# Patient Record
Sex: FEMALE | Race: ASIAN | Employment: FULL TIME | URBAN - METROPOLITAN AREA
[De-identification: names, ages, dates, MRNs, and addresses within clinical notes are randomized per-mention and may not be internally consistent; named-entity substitution may affect disease eponyms.]

---

## 2017-01-24 DIAGNOSIS — E78.5 HYPERLIPIDEMIA, UNSPECIFIED HYPERLIPIDEMIA TYPE: ICD-10-CM

## 2017-01-24 RX ORDER — ATORVASTATIN CALCIUM 10 MG/1
10 TABLET, FILM COATED ORAL DAILY
Qty: 30 TAB | Refills: 5 | Status: SHIPPED | OUTPATIENT
Start: 2017-01-24 | End: 2017-07-31 | Stop reason: SDUPTHER

## 2017-03-31 DIAGNOSIS — I10 ESSENTIAL HYPERTENSION, BENIGN: ICD-10-CM

## 2017-03-31 RX ORDER — VALSARTAN 160 MG/1
160 TABLET ORAL DAILY
Qty: 30 TAB | Refills: 2 | Status: SHIPPED | OUTPATIENT
Start: 2017-03-31 | End: 2017-06-28 | Stop reason: SDUPTHER

## 2017-04-26 ENCOUNTER — OFFICE VISIT (OUTPATIENT)
Dept: INTERNAL MEDICINE CLINIC | Age: 62
End: 2017-04-26

## 2017-04-26 VITALS
OXYGEN SATURATION: 99 % | HEART RATE: 66 BPM | DIASTOLIC BLOOD PRESSURE: 69 MMHG | HEIGHT: 61 IN | RESPIRATION RATE: 14 BRPM | TEMPERATURE: 98.5 F | SYSTOLIC BLOOD PRESSURE: 115 MMHG | BODY MASS INDEX: 20.88 KG/M2 | WEIGHT: 110.6 LBS

## 2017-04-26 DIAGNOSIS — I10 ESSENTIAL HYPERTENSION, BENIGN: ICD-10-CM

## 2017-04-26 DIAGNOSIS — R73.02 GLUCOSE INTOLERANCE (IMPAIRED GLUCOSE TOLERANCE): ICD-10-CM

## 2017-04-26 DIAGNOSIS — E78.5 HYPERLIPIDEMIA LDL GOAL <130: Primary | Chronic | ICD-10-CM

## 2017-04-26 DIAGNOSIS — D50.9 IRON DEFICIENCY ANEMIA, UNSPECIFIED IRON DEFICIENCY ANEMIA TYPE: ICD-10-CM

## 2017-04-26 NOTE — PROGRESS NOTES
HISTORY OF PRESENT ILLNESS  Maribell Bowman is a 63 y.o. female. HPI  Subjective:   Maribell Bowman is a 63 y.o. female with hyperlipidemia. Cardiovascular risk analysis - 63 y.o. female LDL goal is under 130. ROS: taking medications as instructed, no medication side effects noted, no TIA's, no chest pain on exertion, no dyspnea on exertion, no swelling of ankles. Tolerating meds, no myalgias or other side effects noted  New concerns: stable  Subjective:   Maribell Bowman is a 63 y.o. female with hypertension. Hypertension ROS: taking medications as instructed, no medication side effects noted, no TIA's, no chest pain on exertion, no dyspnea on exertion, no swelling of ankles. New concerns: stable. Prediabetes-c ont to work on exercise and eating right - higher protein less carbs    Anemia- she is not taking iron- a multivitamin that she takes with iron     Review of Systems   Constitutional: Negative. Negative for chills, diaphoresis, fever, malaise/fatigue and weight loss. HENT: Negative for congestion, nosebleeds and tinnitus. Eyes: Negative for blurred vision, double vision and photophobia. Respiratory: Negative for cough, hemoptysis, sputum production, shortness of breath and wheezing. Cardiovascular: Negative for chest pain, palpitations, orthopnea, claudication, leg swelling and PND. Gastrointestinal: Negative for abdominal pain, blood in stool, constipation, diarrhea, heartburn, melena, nausea and vomiting. Genitourinary: Negative for dysuria, frequency, hematuria and urgency. Musculoskeletal: Negative for back pain, joint pain, myalgias and neck pain. Skin: Negative for itching and rash. Neurological: Negative for dizziness, tingling, sensory change, speech change, focal weakness, weakness and headaches. Endo/Heme/Allergies: Negative for polydipsia. Does not bruise/bleed easily. Psychiatric/Behavioral: Negative for depression.  The patient is not nervous/anxious and does not have insomnia. Physical Exam   Constitutional: She is oriented to person, place, and time. She appears well-developed and well-nourished. HENT:   Head: Normocephalic and atraumatic. Right Ear: External ear normal.   Left Ear: External ear normal.   Nose: Nose normal.   Mouth/Throat: Oropharynx is clear and moist.   Neck: Normal range of motion. Neck supple. No JVD present. Carotid bruit is not present. No thyroid mass and no thyromegaly present. Cardiovascular: Normal rate, regular rhythm, S1 normal, S2 normal, normal heart sounds, intact distal pulses and normal pulses. Exam reveals no gallop and no friction rub. No murmur heard. Pulmonary/Chest: Effort normal and breath sounds normal.   Abdominal: Soft. Bowel sounds are normal.   Musculoskeletal: Normal range of motion. Neurological: She is alert and oriented to person, place, and time. She has normal strength. Skin: Skin is warm and dry. Psychiatric: She has a normal mood and affect. Her behavior is normal. Judgment and thought content normal.   Nursing note and vitals reviewed.       ASSESSMENT and PLAN  Basia Davis was seen today for complete physical.    Diagnoses and all orders for this visit:    Hyperlipidemia LDL goal <130-cont current medicine   -     LIPID PANEL    Essential hypertension, benign- at goal stable  -     METABOLIC PANEL, COMPREHENSIVE    Iron deficiency anemia, unspecified iron deficiency anemia type- she is going to get her colonoscopy and EGD   -     CBC W/O DIFF  -     IRON PROFILE  -     FERRITIN    Prediabetes- will check labs today, doing well cont with exercise and eating right   lab results and schedule of future lab studies reviewed with patient  reviewed diet, exercise and weight control  cardiovascular risk and specific lipid/LDL goals reviewed  reviewed medications and side effects in detail

## 2017-05-05 LAB
ALBUMIN SERPL-MCNC: 4.1 G/DL (ref 3.6–4.8)
ALBUMIN/GLOB SERPL: 1.5 {RATIO} (ref 1.2–2.2)
ALP SERPL-CCNC: 58 IU/L (ref 39–117)
ALT SERPL-CCNC: 9 IU/L (ref 0–32)
AST SERPL-CCNC: 21 IU/L (ref 0–40)
BILIRUB SERPL-MCNC: 0.3 MG/DL (ref 0–1.2)
BUN SERPL-MCNC: 13 MG/DL (ref 8–27)
BUN/CREAT SERPL: 23 (ref 12–28)
CALCIUM SERPL-MCNC: 9.1 MG/DL (ref 8.7–10.3)
CHLORIDE SERPL-SCNC: 98 MMOL/L (ref 96–106)
CHOLEST SERPL-MCNC: 195 MG/DL (ref 100–199)
CO2 SERPL-SCNC: 26 MMOL/L (ref 18–29)
CREAT SERPL-MCNC: 0.57 MG/DL (ref 0.57–1)
ERYTHROCYTE [DISTWIDTH] IN BLOOD BY AUTOMATED COUNT: 13.6 % (ref 12.3–15.4)
FERRITIN SERPL-MCNC: 59 NG/ML (ref 15–150)
GLOBULIN SER CALC-MCNC: 2.7 G/DL (ref 1.5–4.5)
GLUCOSE SERPL-MCNC: 90 MG/DL (ref 65–99)
HCT VFR BLD AUTO: 34 % (ref 34–46.6)
HDLC SERPL-MCNC: 80 MG/DL
HGB BLD-MCNC: 12 G/DL (ref 11.1–15.9)
INTERPRETATION, 910389: NORMAL
IRON SATN MFR SERPL: 33 % (ref 15–55)
IRON SERPL-MCNC: 112 UG/DL (ref 27–159)
LDLC SERPL CALC-MCNC: 94 MG/DL (ref 0–99)
MCH RBC QN AUTO: 30.6 PG (ref 26.6–33)
MCHC RBC AUTO-ENTMCNC: 35.3 G/DL (ref 31.5–35.7)
MCV RBC AUTO: 87 FL (ref 79–97)
PLATELET # BLD AUTO: 261 X10E3/UL (ref 150–379)
POTASSIUM SERPL-SCNC: 4.3 MMOL/L (ref 3.5–5.2)
PROT SERPL-MCNC: 6.8 G/DL (ref 6–8.5)
RBC # BLD AUTO: 3.92 X10E6/UL (ref 3.77–5.28)
SODIUM SERPL-SCNC: 138 MMOL/L (ref 134–144)
TIBC SERPL-MCNC: 342 UG/DL (ref 250–450)
TRIGL SERPL-MCNC: 106 MG/DL (ref 0–149)
UIBC SERPL-MCNC: 230 UG/DL (ref 131–425)
VLDLC SERPL CALC-MCNC: 21 MG/DL (ref 5–40)
WBC # BLD AUTO: 3.9 X10E3/UL (ref 3.4–10.8)

## 2017-05-09 LAB
HBA1C MFR BLD: 6.1 % (ref 4.8–5.6)
SPECIMEN STATUS REPORT, ROLRST: NORMAL

## 2017-06-28 DIAGNOSIS — I10 ESSENTIAL HYPERTENSION, BENIGN: ICD-10-CM

## 2017-06-28 RX ORDER — VALSARTAN 160 MG/1
160 TABLET ORAL DAILY
Qty: 30 TAB | Refills: 3 | Status: SHIPPED | OUTPATIENT
Start: 2017-06-28 | End: 2017-11-24 | Stop reason: SDUPTHER

## 2017-07-31 DIAGNOSIS — E78.5 HYPERLIPIDEMIA, UNSPECIFIED HYPERLIPIDEMIA TYPE: ICD-10-CM

## 2017-07-31 RX ORDER — ATORVASTATIN CALCIUM 10 MG/1
10 TABLET, FILM COATED ORAL DAILY
Qty: 30 TAB | Refills: 11 | Status: SHIPPED | OUTPATIENT
Start: 2017-07-31 | End: 2018-01-24 | Stop reason: SDUPTHER

## 2017-12-21 DIAGNOSIS — I10 ESSENTIAL HYPERTENSION, BENIGN: ICD-10-CM

## 2017-12-21 RX ORDER — VALSARTAN 160 MG/1
TABLET ORAL
Qty: 30 TAB | Refills: 0 | Status: SHIPPED | OUTPATIENT
Start: 2017-12-21 | End: 2018-01-24 | Stop reason: SDUPTHER

## 2017-12-23 DIAGNOSIS — I10 ESSENTIAL HYPERTENSION, BENIGN: ICD-10-CM

## 2017-12-23 RX ORDER — VALSARTAN 160 MG/1
TABLET ORAL
Qty: 30 TAB | Refills: 0 | Status: SHIPPED | OUTPATIENT
Start: 2017-12-23 | End: 2018-01-24 | Stop reason: SDUPTHER

## 2018-01-24 ENCOUNTER — OFFICE VISIT (OUTPATIENT)
Dept: INTERNAL MEDICINE CLINIC | Age: 63
End: 2018-01-24

## 2018-01-24 VITALS
DIASTOLIC BLOOD PRESSURE: 67 MMHG | HEIGHT: 61 IN | OXYGEN SATURATION: 99 % | RESPIRATION RATE: 14 BRPM | HEART RATE: 78 BPM | WEIGHT: 113.2 LBS | TEMPERATURE: 98 F | BODY MASS INDEX: 21.37 KG/M2 | SYSTOLIC BLOOD PRESSURE: 116 MMHG

## 2018-01-24 DIAGNOSIS — M25.549 ARTHRALGIA OF HAND, UNSPECIFIED LATERALITY: ICD-10-CM

## 2018-01-24 DIAGNOSIS — E78.5 HYPERLIPIDEMIA, UNSPECIFIED HYPERLIPIDEMIA TYPE: ICD-10-CM

## 2018-01-24 DIAGNOSIS — I10 ESSENTIAL HYPERTENSION, BENIGN: ICD-10-CM

## 2018-01-24 DIAGNOSIS — R73.02 GLUCOSE INTOLERANCE (IMPAIRED GLUCOSE TOLERANCE): Primary | ICD-10-CM

## 2018-01-24 RX ORDER — VALSARTAN 160 MG/1
TABLET ORAL
Qty: 90 TAB | Refills: 1 | Status: SHIPPED | OUTPATIENT
Start: 2018-01-24 | End: 2018-11-14

## 2018-01-24 RX ORDER — ATORVASTATIN CALCIUM 10 MG/1
10 TABLET, FILM COATED ORAL DAILY
Qty: 90 TAB | Refills: 1 | Status: SHIPPED | OUTPATIENT
Start: 2018-01-24 | End: 2018-07-03 | Stop reason: SDUPTHER

## 2018-01-24 NOTE — PROGRESS NOTES
HISTORY OF PRESENT ILLNESS  Ajith Kendall is a 63 y.o. female. Presents with . HPI   Patient reports cough for a few days. She denies fever or chills. She states the cough is much better and flu swab was negative. Patient reports some joint pains in fingers. Hypertension ROS: taking medications as instructed, no medication side effects noted, no TIA's, no chest pain on exertion, no dyspnea on exertion, no swelling of ankles. New concerns:  Patient's BP in office today is 116/67. Patient continues Valsartan. Hyperlipidemia:  Cardiovascular risk analysis - 63 y.o. female LDL goal is under 130. ROS: taking medications as instructed, no medication side effects noted, no TIA's, no chest pain on exertion, no dyspnea on exertion, no swelling of ankles. Tolerating meds, no myalgias or other side effects noted  New concerns: Last LDL was 94 on 5/4/17. Patient continues Lipitor. Patient continues to exercise with walking 3 miles regularly. Review of Systems   All other systems reviewed and are negative. Physical Exam   Constitutional: She is oriented to person, place, and time. She appears well-developed and well-nourished. HENT:   Head: Normocephalic and atraumatic. Right Ear: External ear normal.   Left Ear: External ear normal.   Nose: Nose normal.   Mouth/Throat: Oropharynx is clear and moist.   Eyes: Conjunctivae and EOM are normal.   Neck: Normal range of motion. Neck supple. Carotid bruit is not present. No thyroid mass and no thyromegaly present. Cardiovascular: Normal rate, regular rhythm, S1 normal, S2 normal, normal heart sounds and intact distal pulses. Pulmonary/Chest: Effort normal and breath sounds normal.   Abdominal: Soft. Normal appearance and bowel sounds are normal. There is no hepatosplenomegaly. There is no tenderness. Musculoskeletal: Normal range of motion. Neurological: She is alert and oriented to person, place, and time. She has normal strength.  No cranial nerve deficit or sensory deficit. Coordination normal.   Skin: Skin is warm, dry and intact. No abrasion and no rash noted. Psychiatric: She has a normal mood and affect. Her behavior is normal. Judgment and thought content normal.   Nursing note and vitals reviewed. ASSESSMENT and PLAN  Diagnoses and all orders for this visit:    1. Glucose intolerance (impaired glucose tolerance)  Last A1C was 6.1%. Will monitor.   -     HEMOGLOBIN A1C WITH EAG  -     HEMOGLOBIN A1C WITH EAG    2. Essential hypertension, benign  BP is at goal. I do not recommend any change in medications. -     valsartan (DIOVAN) 160 mg tablet; TAKE 1 TABLET BY MOUTH DAILY  -     CBC W/O DIFF  -     CBC W/O DIFF    3. Hyperlipidemia, unspecified hyperlipidemia type  Stable, patient tolerating meds, no myalgias. I do not recommend any change in medications. -     atorvastatin (LIPITOR) 10 mg tablet; Take 1 Tab by mouth daily.  -     LIPID PANEL  -     METABOLIC PANEL, COMPREHENSIVE  -     METABOLIC PANEL, COMPREHENSIVE  -     LIPID PANEL    4. Arthralgia of hand, unspecified laterality  Will rule out gout flare as contributing to joint pains. Suspicious of arthritis and that it is inherited. -     URIC ACID  -     URIC ACID    Lab results and schedule of future lab studies reviewed with patient  reviewed diet, exercise and weight control    Written by Nydia Oviedo, as dictated by Ronnie Herrera MD.     Current diagnosis and concerns discussed with pt at length. Understands risks and benefits or current treatment plan and medications and accepts the treatment and medication with any possible risks. Pt asks appropriate questions which were answered. Pt instructed to call with any concerns or problems.

## 2018-01-26 LAB
ALBUMIN SERPL-MCNC: 4.4 G/DL (ref 3.6–4.8)
ALBUMIN/GLOB SERPL: 1.7 {RATIO} (ref 1.2–2.2)
ALP SERPL-CCNC: 62 IU/L (ref 39–117)
ALT SERPL-CCNC: 10 IU/L (ref 0–32)
AST SERPL-CCNC: 19 IU/L (ref 0–40)
BILIRUB SERPL-MCNC: 0.4 MG/DL (ref 0–1.2)
BUN SERPL-MCNC: 17 MG/DL (ref 8–27)
BUN/CREAT SERPL: 29 (ref 12–28)
CALCIUM SERPL-MCNC: 9.3 MG/DL (ref 8.7–10.3)
CHLORIDE SERPL-SCNC: 96 MMOL/L (ref 96–106)
CHOLEST SERPL-MCNC: 230 MG/DL (ref 100–199)
CO2 SERPL-SCNC: 27 MMOL/L (ref 18–29)
CREAT SERPL-MCNC: 0.59 MG/DL (ref 0.57–1)
ERYTHROCYTE [DISTWIDTH] IN BLOOD BY AUTOMATED COUNT: 12.9 % (ref 12.3–15.4)
EST. AVERAGE GLUCOSE BLD GHB EST-MCNC: 123 MG/DL
GFR SERPLBLD CREATININE-BSD FMLA CKD-EPI: 100 ML/MIN/1.73
GFR SERPLBLD CREATININE-BSD FMLA CKD-EPI: 115 ML/MIN/1.73
GLOBULIN SER CALC-MCNC: 2.6 G/DL (ref 1.5–4.5)
GLUCOSE SERPL-MCNC: 94 MG/DL (ref 65–99)
HBA1C MFR BLD: 5.9 % (ref 4.8–5.6)
HCT VFR BLD AUTO: 37.3 % (ref 34–46.6)
HDLC SERPL-MCNC: 78 MG/DL
HGB BLD-MCNC: 12.5 G/DL (ref 11.1–15.9)
INTERPRETATION, 910389: NORMAL
LDLC SERPL CALC-MCNC: 132 MG/DL (ref 0–99)
MCH RBC QN AUTO: 30.3 PG (ref 26.6–33)
MCHC RBC AUTO-ENTMCNC: 33.5 G/DL (ref 31.5–35.7)
MCV RBC AUTO: 90 FL (ref 79–97)
PLATELET # BLD AUTO: 323 X10E3/UL (ref 150–379)
POTASSIUM SERPL-SCNC: 4.7 MMOL/L (ref 3.5–5.2)
PROT SERPL-MCNC: 7 G/DL (ref 6–8.5)
RBC # BLD AUTO: 4.13 X10E6/UL (ref 3.77–5.28)
SODIUM SERPL-SCNC: 137 MMOL/L (ref 134–144)
TRIGL SERPL-MCNC: 98 MG/DL (ref 0–149)
URATE SERPL-MCNC: 5.2 MG/DL (ref 2.5–7.1)
VLDLC SERPL CALC-MCNC: 20 MG/DL (ref 5–40)
WBC # BLD AUTO: 4.1 X10E3/UL (ref 3.4–10.8)

## 2018-07-03 DIAGNOSIS — E78.5 HYPERLIPIDEMIA, UNSPECIFIED HYPERLIPIDEMIA TYPE: ICD-10-CM

## 2018-07-03 RX ORDER — ATORVASTATIN CALCIUM 10 MG/1
10 TABLET, FILM COATED ORAL DAILY
Qty: 90 TAB | Refills: 1 | Status: SHIPPED | OUTPATIENT
Start: 2018-07-03 | End: 2019-01-04 | Stop reason: SDUPTHER

## 2018-07-03 NOTE — TELEPHONE ENCOUNTER
Patient needs a refill on her atorvastatin (LIPITOR) 10 mg tablet  Called into the [facility]  [phone]

## 2018-10-13 RX ORDER — IRBESARTAN 150 MG/1
TABLET ORAL
Qty: 30 TAB | Refills: 1 | Status: SHIPPED | OUTPATIENT
Start: 2018-10-13 | End: 2018-12-11 | Stop reason: SDUPTHER

## 2018-11-14 ENCOUNTER — OFFICE VISIT (OUTPATIENT)
Dept: INTERNAL MEDICINE CLINIC | Age: 63
End: 2018-11-14

## 2018-11-14 VITALS
HEIGHT: 61 IN | BODY MASS INDEX: 21.11 KG/M2 | RESPIRATION RATE: 14 BRPM | HEART RATE: 64 BPM | OXYGEN SATURATION: 100 % | SYSTOLIC BLOOD PRESSURE: 132 MMHG | DIASTOLIC BLOOD PRESSURE: 72 MMHG | TEMPERATURE: 97.5 F | WEIGHT: 111.8 LBS

## 2018-11-14 DIAGNOSIS — I10 ESSENTIAL HYPERTENSION, BENIGN: Primary | ICD-10-CM

## 2018-11-14 DIAGNOSIS — E78.5 HYPERLIPIDEMIA, UNSPECIFIED HYPERLIPIDEMIA TYPE: ICD-10-CM

## 2018-11-14 DIAGNOSIS — R73.03 PREDIABETES: ICD-10-CM

## 2018-11-14 NOTE — PROGRESS NOTES
HISTORY OF PRESENT ILLNESS Lisa Ennis is a 66 y.o. female. HPI Hypertension ROS: taking medications as instructed, no medication side effects noted, no TIA's, no chest pain on exertion, no dyspnea on exertion, no swelling of ankles. New concerns:  Patient's BP in office today is 132/72. She continues on Avapro and Diovan. She ran 4 miles today and exercises regularly. Hyperlipidemia: 
Cardiovascular risk analysis - 66 y.o. female LDL goal is under 130. ROS: taking medications as instructed, no medication side effects noted, no TIA's, no chest pain on exertion, no dyspnea on exertion, no swelling of ankles. Tolerating meds, no myalgias or other side effects noted New concerns: Her last LDL was 132 on 1/25/18. Pt continues on Lipitor. Pt is afraid to undergo colonoscopy. She also notes that insurance doesn't cover Cologuard. Review of Systems All other systems reviewed and are negative. Physical Exam  
Constitutional: She is oriented to person, place, and time. She appears well-developed and well-nourished. HENT:  
Head: Normocephalic and atraumatic. Right Ear: External ear normal.  
Left Ear: External ear normal.  
Nose: Nose normal.  
Mouth/Throat: Oropharynx is clear and moist.  
Eyes: Conjunctivae and EOM are normal.  
Neck: Normal range of motion. Neck supple. Carotid bruit is not present. No thyroid mass and no thyromegaly present. Cardiovascular: Normal rate, regular rhythm, S1 normal, S2 normal, normal heart sounds and intact distal pulses. Pulmonary/Chest: Effort normal and breath sounds normal.  
Abdominal: Soft. Normal appearance and bowel sounds are normal. There is no hepatosplenomegaly. There is no tenderness. Musculoskeletal: Normal range of motion. Neurological: She is alert and oriented to person, place, and time. She has normal strength. No cranial nerve deficit or sensory deficit.  Coordination normal.  
 Skin: Skin is warm, dry and intact. No abrasion and no rash noted. Psychiatric: She has a normal mood and affect. Her behavior is normal. Judgment and thought content normal.  
Nursing note and vitals reviewed. ASSESSMENT and PLAN Diagnoses and all orders for this visit: 1. Essential hypertension, benign BP is at goal. I do not recommend any change in medications. 
-     METABOLIC PANEL, COMPREHENSIVE 2. Hyperlipidemia, unspecified hyperlipidemia type Stable, patient tolerating meds, no myalgias. I do not recommend any change in medications. 
-     LIPID PANEL 3. Prediabetes Will monitor for lab results.  
-     HEMOGLOBIN A1C WITH EAG Additional Comments: Encouraged pt to consider colonoscopy. This note will not be viewable in [address] Lab results and schedule of future lab studies reviewed with patient. Reviewed diet, exercise and weight control. Written by Ricky Gannon, as dictated by Waylan Goodell, MD.  
 
Current diagnosis and concerns discussed with pt at length. Understands risks and benefits or current treatment plan and medications and accepts the treatment and medication with any possible risks. Pt asks appropriate questions which were answered. Pt instructed to call with any concerns or problems.

## 2018-11-30 LAB
ALBUMIN SERPL-MCNC: 4.4 G/DL (ref 3.6–4.8)
ALBUMIN/GLOB SERPL: 1.6 {RATIO} (ref 1.2–2.2)
ALP SERPL-CCNC: 70 IU/L (ref 39–117)
ALT SERPL-CCNC: 8 IU/L (ref 0–32)
AST SERPL-CCNC: 19 IU/L (ref 0–40)
BILIRUB SERPL-MCNC: 0.4 MG/DL (ref 0–1.2)
BUN SERPL-MCNC: 13 MG/DL (ref 8–27)
BUN/CREAT SERPL: 21 (ref 12–28)
CALCIUM SERPL-MCNC: 9.5 MG/DL (ref 8.7–10.3)
CHLORIDE SERPL-SCNC: 97 MMOL/L (ref 96–106)
CHOLEST SERPL-MCNC: 208 MG/DL (ref 100–199)
CO2 SERPL-SCNC: 26 MMOL/L (ref 20–29)
CREAT SERPL-MCNC: 0.62 MG/DL (ref 0.57–1)
EST. AVERAGE GLUCOSE BLD GHB EST-MCNC: 120 MG/DL
GLOBULIN SER CALC-MCNC: 2.8 G/DL (ref 1.5–4.5)
GLUCOSE SERPL-MCNC: 95 MG/DL (ref 65–99)
HBA1C MFR BLD: 5.8 % (ref 4.8–5.6)
HDLC SERPL-MCNC: 89 MG/DL
INTERPRETATION, 910389: NORMAL
LDLC SERPL CALC-MCNC: 103 MG/DL (ref 0–99)
POTASSIUM SERPL-SCNC: 4.6 MMOL/L (ref 3.5–5.2)
PROT SERPL-MCNC: 7.2 G/DL (ref 6–8.5)
SODIUM SERPL-SCNC: 136 MMOL/L (ref 134–144)
TRIGL SERPL-MCNC: 82 MG/DL (ref 0–149)
VLDLC SERPL CALC-MCNC: 16 MG/DL (ref 5–40)

## 2018-12-01 LAB
BASOPHILS # BLD AUTO: 0 X10E3/UL (ref 0–0.2)
BASOPHILS NFR BLD AUTO: 1 %
EOSINOPHIL # BLD AUTO: 0.2 X10E3/UL (ref 0–0.4)
EOSINOPHIL NFR BLD AUTO: 5 %
ERYTHROCYTE [DISTWIDTH] IN BLOOD BY AUTOMATED COUNT: 13 % (ref 12.3–15.4)
HCT VFR BLD AUTO: 39.8 % (ref 34–46.6)
HGB BLD-MCNC: 13.1 G/DL (ref 11.1–15.9)
IMM GRANULOCYTES # BLD: 0 X10E3/UL (ref 0–0.1)
IMM GRANULOCYTES NFR BLD: 0 %
LYMPHOCYTES # BLD AUTO: 1.7 X10E3/UL (ref 0.7–3.1)
LYMPHOCYTES NFR BLD AUTO: 39 %
MCH RBC QN AUTO: 30.7 PG (ref 26.6–33)
MCHC RBC AUTO-ENTMCNC: 32.9 G/DL (ref 31.5–35.7)
MCV RBC AUTO: 93 FL (ref 79–97)
MONOCYTES # BLD AUTO: 0.4 X10E3/UL (ref 0.1–0.9)
MONOCYTES NFR BLD AUTO: 8 %
NEUTROPHILS # BLD AUTO: 2.1 X10E3/UL (ref 1.4–7)
NEUTROPHILS NFR BLD AUTO: 47 %
PLATELET # BLD AUTO: 298 X10E3/UL (ref 150–379)
RBC # BLD AUTO: 4.27 X10E6/UL (ref 3.77–5.28)
SPECIMEN STATUS REPORT, ROLRST: NORMAL
WBC # BLD AUTO: 4.5 X10E3/UL (ref 3.4–10.8)

## 2018-12-11 RX ORDER — IRBESARTAN 150 MG/1
TABLET ORAL
Qty: 30 TAB | Refills: 1 | Status: SHIPPED | OUTPATIENT
Start: 2018-12-11 | End: 2019-01-13 | Stop reason: SDUPTHER

## 2019-01-04 DIAGNOSIS — E78.5 HYPERLIPIDEMIA, UNSPECIFIED HYPERLIPIDEMIA TYPE: ICD-10-CM

## 2019-01-04 RX ORDER — ATORVASTATIN CALCIUM 10 MG/1
TABLET, FILM COATED ORAL
Qty: 90 TAB | Refills: 1 | Status: SHIPPED | OUTPATIENT
Start: 2019-01-04 | End: 2019-06-10 | Stop reason: SDUPTHER

## 2019-05-12 RX ORDER — IRBESARTAN 150 MG/1
TABLET ORAL
Qty: 30 TAB | Refills: 0 | Status: SHIPPED | OUTPATIENT
Start: 2019-05-12 | End: 2019-06-10 | Stop reason: SDUPTHER

## 2019-06-10 ENCOUNTER — OFFICE VISIT (OUTPATIENT)
Dept: INTERNAL MEDICINE CLINIC | Age: 64
End: 2019-06-10

## 2019-06-10 VITALS
BODY MASS INDEX: 20.69 KG/M2 | HEIGHT: 61 IN | WEIGHT: 109.6 LBS | DIASTOLIC BLOOD PRESSURE: 85 MMHG | OXYGEN SATURATION: 100 % | TEMPERATURE: 98.2 F | RESPIRATION RATE: 16 BRPM | SYSTOLIC BLOOD PRESSURE: 135 MMHG | HEART RATE: 65 BPM

## 2019-06-10 DIAGNOSIS — E78.5 HYPERLIPIDEMIA, UNSPECIFIED HYPERLIPIDEMIA TYPE: Primary | ICD-10-CM

## 2019-06-10 DIAGNOSIS — I10 ESSENTIAL HYPERTENSION, BENIGN: ICD-10-CM

## 2019-06-10 DIAGNOSIS — R73.03 PREDIABETES: ICD-10-CM

## 2019-06-10 RX ORDER — ATORVASTATIN CALCIUM 10 MG/1
10 TABLET, FILM COATED ORAL DAILY
Qty: 90 TAB | Refills: 1 | Status: SHIPPED | OUTPATIENT
Start: 2019-06-10 | End: 2019-12-11 | Stop reason: SDUPTHER

## 2019-06-10 RX ORDER — IRBESARTAN 150 MG/1
150 TABLET ORAL DAILY
Qty: 90 TAB | Refills: 1 | Status: SHIPPED | OUTPATIENT
Start: 2019-06-10 | End: 2019-12-17 | Stop reason: ALTCHOICE

## 2019-06-10 NOTE — PROGRESS NOTES
HISTORY OF PRESENT ILLNESS  Svetlana Horner is a 60 y.o. female. HPI Pt presents with   Prediabetes: Pt reports she exercises frequently. Hypertension ROS: taking medications as instructed, no medication side effects noted, no TIA's, no chest pain on exertion, no dyspnea on exertion, no swelling of ankles. New concerns: BP in office today is 135/85. Hyperlipidemia:  Cardiovascular risk analysis - 60 y.o. female LDL goal is under 100. ROS: taking medications as instructed, no medication side effects noted, no TIA's, no chest pain on exertion, no dyspnea on exertion, no swelling of ankles. Tolerating meds, no myalgias or other side effects noted  New concerns: Pt notes she frequently runs (4 miles). Currently they are dog-sitting and they are walking instead of running. PT reports that her family is moving to [place] next year and she is very excited. Review of Systems   All other systems reviewed and are negative. Physical Exam   Constitutional: She is oriented to person, place, and time. She appears well-developed and well-nourished. HENT:   Head: Normocephalic and atraumatic. Right Ear: External ear normal.   Left Ear: External ear normal.   Nose: Nose normal.   Mouth/Throat: Oropharynx is clear and moist.   Eyes: Pupils are equal, round, and reactive to light. Conjunctivae and EOM are normal.   Neck: Normal range of motion. Neck supple. Carotid bruit is not present. No thyroid mass and no thyromegaly present. Cardiovascular: Normal rate, regular rhythm, normal heart sounds and intact distal pulses. Pulmonary/Chest: Effort normal and breath sounds normal. Right breast exhibits no inverted nipple, no mass, no nipple discharge, no skin change and no tenderness. Left breast exhibits no inverted nipple, no mass, no nipple discharge, no skin change and no tenderness. No breast swelling, tenderness, discharge or bleeding. Breasts are symmetrical.   Abdominal: Soft.  Bowel sounds are normal.   Genitourinary: Rectum normal and vagina normal. Rectal exam shows anal tone normal and guaiac negative stool. No breast swelling, tenderness, discharge or bleeding. Musculoskeletal: Normal range of motion. Neurological: She is alert and oriented to person, place, and time. She has normal strength. No cranial nerve deficit or sensory deficit. Coordination normal.   Skin: Skin is warm and dry. No abrasion and no rash noted. Psychiatric: She has a normal mood and affect. Her behavior is normal. Judgment and thought content normal.   Nursing note and vitals reviewed. ASSESSMENT and PLAN  Diagnoses and all orders for this visit:    1. Hyperlipidemia, unspecified hyperlipidemia type  Stable, patient is tolerating pain medications, no myalgias. I do not recommend any change in medications.   -     LIPID PANEL  -     atorvastatin (LIPITOR) 10 mg tablet; Take 1 Tab by mouth daily. 2. Essential hypertension, benign  BP is at goal. I do not recommend any change in medications.   -     METABOLIC PANEL, COMPREHENSIVE    3. Prediabetes  Stable and well-managed. Will continue to monitor for improvements or changes.  -     HEMOGLOBIN A1C WITH EAG    Other orders  -     irbesartan (AVAPRO) 150 mg tablet; Take 1 Tab by mouth daily. Lab results and schedule of future lab studies reviewed with patient. Reviewed diet, exercise and weight control. Written by Evgeny Jaquez, as dictated by Bronwyn Bhatti MD.     Current diagnosis and concerns discussed with pt at length. Understands risks and benefits or current treatment plan and medications and accepts the treatment and medication with any possible risks. Pt asks appropriate questions which were answered. Pt instructed to call with any concerns or problems. This note will not be viewable in [address]

## 2019-06-11 LAB
ALBUMIN SERPL-MCNC: 4.7 G/DL (ref 3.6–4.8)
ALBUMIN/GLOB SERPL: 1.9 {RATIO} (ref 1.2–2.2)
ALP SERPL-CCNC: 66 IU/L (ref 39–117)
ALT SERPL-CCNC: 11 IU/L (ref 0–32)
AST SERPL-CCNC: 21 IU/L (ref 0–40)
BILIRUB SERPL-MCNC: 0.3 MG/DL (ref 0–1.2)
BUN SERPL-MCNC: 12 MG/DL (ref 8–27)
BUN/CREAT SERPL: 20 (ref 12–28)
CALCIUM SERPL-MCNC: 9.5 MG/DL (ref 8.7–10.3)
CHLORIDE SERPL-SCNC: 100 MMOL/L (ref 96–106)
CHOLEST SERPL-MCNC: 216 MG/DL (ref 100–199)
CO2 SERPL-SCNC: 24 MMOL/L (ref 20–29)
CREAT SERPL-MCNC: 0.59 MG/DL (ref 0.57–1)
EST. AVERAGE GLUCOSE BLD GHB EST-MCNC: 123 MG/DL
GLOBULIN SER CALC-MCNC: 2.5 G/DL (ref 1.5–4.5)
GLUCOSE SERPL-MCNC: 98 MG/DL (ref 65–99)
HBA1C MFR BLD: 5.9 % (ref 4.8–5.6)
HDLC SERPL-MCNC: 84 MG/DL
INTERPRETATION, 910389: NORMAL
LDLC SERPL CALC-MCNC: 115 MG/DL (ref 0–99)
POTASSIUM SERPL-SCNC: 4.6 MMOL/L (ref 3.5–5.2)
PROT SERPL-MCNC: 7.2 G/DL (ref 6–8.5)
SODIUM SERPL-SCNC: 138 MMOL/L (ref 134–144)
TRIGL SERPL-MCNC: 84 MG/DL (ref 0–149)
VLDLC SERPL CALC-MCNC: 17 MG/DL (ref 5–40)

## 2019-06-20 RX ORDER — VALSARTAN 160 MG/1
160 TABLET ORAL DAILY
Qty: 90 TAB | Refills: 1 | Status: SHIPPED | OUTPATIENT
Start: 2019-06-20 | End: 2019-12-11 | Stop reason: SDUPTHER

## 2019-12-11 DIAGNOSIS — E78.5 HYPERLIPIDEMIA, UNSPECIFIED HYPERLIPIDEMIA TYPE: ICD-10-CM

## 2019-12-11 RX ORDER — VALSARTAN 160 MG/1
TABLET ORAL
Qty: 90 TAB | Refills: 1 | Status: SHIPPED | OUTPATIENT
Start: 2019-12-11 | End: 2019-12-17 | Stop reason: ALTCHOICE

## 2019-12-11 RX ORDER — ATORVASTATIN CALCIUM 10 MG/1
TABLET, FILM COATED ORAL
Qty: 90 TAB | Refills: 1 | Status: SHIPPED | OUTPATIENT
Start: 2019-12-11 | End: 2020-06-06

## 2019-12-13 ENCOUNTER — HOSPITAL ENCOUNTER (EMERGENCY)
Age: 64
Discharge: HOME | End: 2019-12-13
Attending: EMERGENCY MEDICINE
Payer: COMMERCIAL

## 2019-12-13 VITALS
OXYGEN SATURATION: 97 % | RESPIRATION RATE: 16 BRPM | DIASTOLIC BLOOD PRESSURE: 84 MMHG | HEIGHT: 61 IN | BODY MASS INDEX: 20.58 KG/M2 | WEIGHT: 109 LBS | HEART RATE: 73 BPM | SYSTOLIC BLOOD PRESSURE: 190 MMHG | TEMPERATURE: 98.1 F

## 2019-12-13 DIAGNOSIS — M25.562 ACUTE PAIN OF LEFT KNEE: Primary | ICD-10-CM

## 2019-12-13 PROCEDURE — 99283 EMERGENCY DEPT VISIT LOW MDM: CPT

## 2019-12-13 RX ORDER — NAPROXEN 500 MG/1
500 TABLET ORAL 2 TIMES DAILY WITH MEALS
Qty: 20 TAB | Refills: 0 | Status: SHIPPED | OUTPATIENT
Start: 2019-12-13 | End: 2019-12-23

## 2019-12-13 NOTE — ED TRIAGE NOTES
Pt ambulated to the treatment area with a steady gait. Pt states \"about 2-3 weeks ago I was running and landed on my left knee. It bothered me but I put a pressure support band on it and it felt better but now when I walk it hurts. \"

## 2019-12-13 NOTE — DISCHARGE INSTRUCTIONS

## 2019-12-13 NOTE — LETTER
[facility] EMERGENCY DEPT 
[address] [place] [address] 
[phone] Work/School Note Date: 12/13/2019 To Whom It May concern: 
 
Cristi Martinez was seen and treated today in the emergency room by the following provider: 
Dr. Lj Ward. Cristi Martinez may return to work on 12/18/2019. Sincerely, Ani Austin RN

## 2019-12-13 NOTE — ED PROVIDER NOTES
28-year-old female with a history of hypertension hyperlipidemia presents with left knee pain. She was running 2 or 3 weeks ago and fell on her left knee. It has not bothered her up until a couple days ago. She has been wearing a knee brace. 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 She denies any fever, chills, warmth or erythema to the knee. She denies any recent tick bites or rashes. She has been able to ambulate but it has been painful. She has noticed some swelling. Knee Pain           Past Medical History:   Diagnosis Date    Essential hypertension, benign 6/17/2015    Other and unspecified hyperlipidemia 11/10/2009    Other and unspecified hyperlipidemia 11/10/2009       History reviewed. No pertinent surgical history. History reviewed. No pertinent family history.     Social History     Socioeconomic History    Marital status:      Spouse name: Not on file    Number of children: Not on file    Years of education: Not on file    Highest education level: Not on file   Occupational History    Not on file   Social Needs    Financial resource strain: Not on file    Food insecurity:     Worry: Not on file     Inability: Not on file    Transportation needs:     Medical: Not on file     Non-medical: Not on file   Tobacco Use    Smoking status: Never Smoker    Smokeless tobacco: Never Used   Substance and Sexual Activity    Alcohol use: No    Drug use: No    Sexual activity: Not on file   Lifestyle    Physical activity:     Days per week: Not on file     Minutes per session: Not on file    Stress: Not on file   Relationships    Social connections:     Talks on phone: Not on file     Gets together: Not on file     Attends Islam service: Not on file     Active member of club or organization: Not on file     Attends meetings of clubs or organizations: Not on file     Relationship status: Not on file    Intimate partner violence:     Fear of current or ex partner: Not on file     Emotionally abused: Not on file     Physically abused: Not on file     Forced sexual activity: Not on file   Other Topics Concern    Not on file   Social History Narrative    Not on file         ALLERGIES: Patient has no known allergies. Review of Systems   All other systems reviewed and are negative. Vitals:    12/13/19 0931   BP: 190/84   Pulse: 73   Resp: 16   Temp: 98.1 °F (36.7 °C)   SpO2: 97%   Weight: 49.4 kg (109 lb)   Height: 5' 1\" (1.549 m)            Physical Exam  Constitutional:       Appearance: She is well-developed. HENT:      Head: Normocephalic and atraumatic. Eyes:      General: No scleral icterus. Neck:      Musculoskeletal: No neck rigidity. Trachea: No tracheal deviation. Cardiovascular:      Rate and Rhythm: Normal rate. Pulmonary:      Effort: Pulmonary effort is normal. No respiratory distress. Abdominal:      General: There is no distension. Genitourinary:     Comments: deferred  Musculoskeletal:         General: No deformity. Comments: Left knee exam shows small effusion, no erythema or warmth, unable to elicit tenderness, no pain with valgus or varus stress, click and pain with Kris. Skin:     General: Skin is dry. Neurological:      General: No focal deficit present. Mental Status: She is alert. Psychiatric:         Mood and Affect: Mood normal.          MDM  Number of Diagnoses or Management Options  Acute pain of left knee:   Diagnosis management comments: 62-year-old female with knee pain likely secondary to meniscal tear versus osteoarthritis. No warmth, erythema, fever to suggest septic arthritis. No history of tick bite or rash to suggest Lyme arthritis. Ambulating, doubt fracture. Offered x-ray but reassured her that I do not feel that is fractured and she would be x-rayed when she follows up with orthopedics. She declined the x-ray. Will start on trial of NSAIDs. Refer to Ortho. Procedures

## 2019-12-13 NOTE — LETTER
[facility] EMERGENCY DEPT 
[address] [address] 
[phone] Work/School Note Date: 12/13/2019 To Whom It May concern: 
 
Brit Maciel was seen and treated today in the emergency room by the following providers: 
Dr. Everett Varela. Brit Maciel may return to work on 12/16/2019. Sincerely, Dwayne Renae RN

## 2019-12-13 NOTE — ED NOTES
Pt was discharged and given instructions by Dr Peyton White. Pt verbalized good understanding of all discharge instructions,prescriptions and F/U care. All questions answered. Pt in stable condition on discharge.

## 2019-12-17 RX ORDER — OLMESARTAN MEDOXOMIL 40 MG/1
40 TABLET ORAL DAILY
Qty: 90 TAB | Refills: 1 | Status: SHIPPED | OUTPATIENT
Start: 2019-12-17 | End: 2020-03-22

## 2020-01-03 ENCOUNTER — OFFICE VISIT (OUTPATIENT)
Dept: INTERNAL MEDICINE CLINIC | Age: 65
End: 2020-01-03

## 2020-01-03 ENCOUNTER — HOSPITAL ENCOUNTER (OUTPATIENT)
Dept: LAB | Age: 65
Discharge: HOME | End: 2020-01-03

## 2020-01-03 VITALS
HEART RATE: 79 BPM | DIASTOLIC BLOOD PRESSURE: 56 MMHG | SYSTOLIC BLOOD PRESSURE: 90 MMHG | HEIGHT: 61 IN | WEIGHT: 110.6 LBS | RESPIRATION RATE: 14 BRPM | TEMPERATURE: 98.4 F | OXYGEN SATURATION: 100 % | BODY MASS INDEX: 20.88 KG/M2

## 2020-01-03 DIAGNOSIS — Z12.11 SCREENING FOR COLON CANCER: ICD-10-CM

## 2020-01-03 DIAGNOSIS — E78.5 HYPERLIPIDEMIA, UNSPECIFIED HYPERLIPIDEMIA TYPE: ICD-10-CM

## 2020-01-03 DIAGNOSIS — I10 ESSENTIAL HYPERTENSION, BENIGN: Primary | ICD-10-CM

## 2020-01-03 DIAGNOSIS — R73.03 PREDIABETES: ICD-10-CM

## 2020-01-03 DIAGNOSIS — M25.562 ACUTE PAIN OF LEFT KNEE: ICD-10-CM

## 2020-01-03 DIAGNOSIS — I10 ESSENTIAL HYPERTENSION, BENIGN: ICD-10-CM

## 2020-01-03 LAB
ALBUMIN SERPL-MCNC: 3.9 G/DL (ref 3.5–5)
ALBUMIN/GLOB SERPL: 1.2 {RATIO} (ref 1.1–2.2)
ALP SERPL-CCNC: 63 U/L (ref 45–117)
ALT SERPL-CCNC: 16 U/L (ref 12–78)
ANION GAP SERPL CALC-SCNC: 4 MMOL/L (ref 5–15)
AST SERPL-CCNC: 19 U/L (ref 15–37)
BILIRUB SERPL-MCNC: 0.4 MG/DL (ref 0.2–1)
BUN SERPL-MCNC: 14 MG/DL (ref 6–20)
BUN/CREAT SERPL: 23 (ref 12–20)
CALCIUM SERPL-MCNC: 9.2 MG/DL (ref 8.5–10.1)
CHLORIDE SERPL-SCNC: 100 MMOL/L (ref 97–108)
CHOLEST SERPL-MCNC: 225 MG/DL
CO2 SERPL-SCNC: 30 MMOL/L (ref 21–32)
CREAT SERPL-MCNC: 0.61 MG/DL (ref 0.55–1.02)
ERYTHROCYTE [DISTWIDTH] IN BLOOD BY AUTOMATED COUNT: 11.7 % (ref 11.5–14.5)
EST. AVERAGE GLUCOSE BLD GHB EST-MCNC: 120 MG/DL
GLOBULIN SER CALC-MCNC: 3.3 G/DL (ref 2–4)
GLUCOSE SERPL-MCNC: 106 MG/DL (ref 65–100)
HBA1C MFR BLD: 5.8 % (ref 4–5.6)
HCT VFR BLD AUTO: 37.8 % (ref 35–47)
HDLC SERPL-MCNC: 81 MG/DL
HDLC SERPL: 2.8 {RATIO} (ref 0–5)
HGB BLD-MCNC: 12.8 G/DL (ref 11.5–16)
LDLC SERPL CALC-MCNC: 120.4 MG/DL (ref 0–100)
LIPID PROFILE,FLP: ABNORMAL
MCH RBC QN AUTO: 30.3 PG (ref 26–34)
MCHC RBC AUTO-ENTMCNC: 33.9 G/DL (ref 30–36.5)
MCV RBC AUTO: 89.6 FL (ref 80–99)
NRBC # BLD: 0 K/UL (ref 0–0.01)
NRBC BLD-RTO: 0 PER 100 WBC
PLATELET # BLD AUTO: 270 K/UL (ref 150–400)
PMV BLD AUTO: 9.9 FL (ref 8.9–12.9)
POTASSIUM SERPL-SCNC: 4.8 MMOL/L (ref 3.5–5.1)
PROT SERPL-MCNC: 7.2 G/DL (ref 6.4–8.2)
RBC # BLD AUTO: 4.22 M/UL (ref 3.8–5.2)
SODIUM SERPL-SCNC: 134 MMOL/L (ref 136–145)
TRIGL SERPL-MCNC: 118 MG/DL (ref ?–150)
VLDLC SERPL CALC-MCNC: 23.6 MG/DL
WBC # BLD AUTO: 5.2 K/UL (ref 3.6–11)

## 2020-01-03 RX ORDER — MELOXICAM 15 MG/1
15 TABLET ORAL DAILY
Qty: 30 TAB | Refills: 3 | Status: SHIPPED | OUTPATIENT
Start: 2020-01-03 | End: 2020-06-29 | Stop reason: ALTCHOICE

## 2020-01-03 NOTE — PROGRESS NOTES
HISTORY OF PRESENT ILLNESS  Pretty Hamlin is a 60 y.o. female. HPI  Hypertension ROS: taking medications as instructed, no medication side effects noted, no TIA's, no chest pain on exertion, no dyspnea on exertion, no swelling of ankles. New concerns: BP in office today is 90/56. Hyperlipidemia:  Cardiovascular risk analysis - 60 y.o. female LDL goal is under 130. ROS: taking medications as instructed, no medication side effects noted, no TIA's, no chest pain on exertion, no dyspnea on exertion, no swelling of ankles. Tolerating meds, no myalgias or other side effects noted  New concerns: Pt's last LDL was 115 on 06/10/19. Knee pain: Pt reports that she presented at ED recently for knee pain. XR of knee showed no arthritis just fluid build up and swelling. was given Baclofen and Naprosyn for her knee pain- but she DC the both due to nausea. She has not been running since her knee injury. Pt reports that she and her  will be moving to [place], and eventually, they will move back home to the [facility]. She notes her is UTD on her mammogram- but she is due soon. She will make an appt this month. She gets her PAP done every 5 years by Dr. Giancarlo Pennington. Denies ever doing a colonoscopy. She notes that she has not drank water since last night. Review of Systems   Musculoskeletal: Positive for joint pain (knee). All other systems reviewed and are negative. Physical Exam  Vitals signs and nursing note reviewed. Constitutional:       Appearance: She is well-developed. HENT:      Head: Normocephalic and atraumatic. Right Ear: External ear normal.      Left Ear: External ear normal.      Nose: Nose normal.   Eyes:      Conjunctiva/sclera: Conjunctivae normal.      Pupils: Pupils are equal, round, and reactive to light. Neck:      Musculoskeletal: Normal range of motion and neck supple. Cardiovascular:      Rate and Rhythm: Normal rate and regular rhythm.       Heart sounds: Normal heart sounds. Pulmonary:      Effort: Pulmonary effort is normal.      Breath sounds: Normal breath sounds. Chest:      Breasts: Breasts are symmetrical.         Right: No inverted nipple, mass, nipple discharge, skin change or tenderness. Left: No inverted nipple, mass, nipple discharge, skin change or tenderness. Abdominal:      General: Bowel sounds are normal.      Palpations: Abdomen is soft. Genitourinary:     Vagina: Normal.      Rectum: Normal. Guaiac result negative. Normal anal tone. Musculoskeletal: Normal range of motion. Skin:     General: Skin is warm and dry. Neurological:      Mental Status: She is alert and oriented to person, place, and time. Psychiatric:         Behavior: Behavior normal.         Thought Content: Thought content normal.         Judgment: Judgment normal.         ASSESSMENT and PLAN  Diagnoses and all orders for this visit:    1. Essential hypertension, benign  BP is at goal. I do not recommend any change in medications. -     CBC W/O DIFF; Future    2. Hyperlipidemia, unspecified hyperlipidemia type  Stable, patient is tolerating medications, no myalgias. I do not recommend any change in medications.   -     METABOLIC PANEL, COMPREHENSIVE; Future  -     LIPID PANEL; Future    3. Screening for colon cancer  Ordered Cologuard. Will continue to monitor for improvements or changes. -     COLOGUARD TEST (FECAL DNA COLORECTAL CANCER SCREENING)    4. Acute pain of left knee  Prescribed Mobic for knee pain. Discussed with pt that Mobic is more stomach protective. Will continue to monitor for improvements or changes. -     meloxicam (MOBIC) 15 mg tablet; Take 1 Tab by mouth daily. 5. Prediabetes  Sugars stable. Continue to follow diabetic diet and monitor sugars.   -     HEMOGLOBIN A1C WITH EAG; Future     Additional comments: Discussed the Shingrex vaccine with pt.  Informed pt that this new vaccine is replacing the old one with a 90% protective factor. Explained to pt that the 2-part vaccine can cause a flu-like illness for 24-48 hours. Lab results and schedule of future lab studies reviewed with patient. Reviewed diet, exercise and weight control. Written by Jeana Armstrong, as dictated by Aaliyah Espinal MD.     Current diagnosis and concerns discussed with pt at length. Understands risks and benefits or current treatment plan and medications and accepts the treatment and medication with any possible risks. Pt asks appropriate questions which were answered. Pt instructed to call with any concerns or problems. This note will not be viewable in [address]

## 2020-01-28 ENCOUNTER — TELEPHONE (OUTPATIENT)
Dept: INTERNAL MEDICINE CLINIC | Age: 65
End: 2020-01-28

## 2020-01-28 NOTE — LETTER
2/4/2020 1:55 PM 
 
Ms. Alec Mora [address] [id number] Ms. Diane, Our office has attempted to contact you via phone on three separate occasions without a response from you. We have been trying to contact you in regards to the results of your Cologuard cancer screening test.  This test has resulted as positive. The recommendation when this test is positive is to have a colonoscopy preform by a gastroenterologist.  Please contact our office to let us know that you have received this letter and that you have scheduled an appointment to have a colonoscopy performed. Sincerely, Damion Sorensen MD

## 2020-02-03 ENCOUNTER — OFFICE VISIT (OUTPATIENT)
Dept: INTERNAL MEDICINE CLINIC | Age: 65
End: 2020-02-03

## 2020-02-03 VITALS
HEIGHT: 61 IN | TEMPERATURE: 98.2 F | OXYGEN SATURATION: 98 % | WEIGHT: 114 LBS | BODY MASS INDEX: 21.52 KG/M2 | RESPIRATION RATE: 16 BRPM | DIASTOLIC BLOOD PRESSURE: 68 MMHG | SYSTOLIC BLOOD PRESSURE: 111 MMHG | HEART RATE: 73 BPM

## 2020-02-03 DIAGNOSIS — I10 ESSENTIAL HYPERTENSION, BENIGN: ICD-10-CM

## 2020-02-03 DIAGNOSIS — R68.89 FLU-LIKE SYMPTOMS: Primary | ICD-10-CM

## 2020-02-03 RX ORDER — AZITHROMYCIN 250 MG/1
250 TABLET, FILM COATED ORAL SEE ADMIN INSTRUCTIONS
Qty: 6 TAB | Refills: 0 | Status: SHIPPED | OUTPATIENT
Start: 2020-02-03 | End: 2020-02-08

## 2020-02-03 RX ORDER — BENZONATATE 200 MG/1
200 CAPSULE ORAL
Qty: 30 CAP | Refills: 0 | Status: SHIPPED | OUTPATIENT
Start: 2020-02-03 | End: 2020-02-10

## 2020-02-03 NOTE — PROGRESS NOTES
HISTORY OF PRESENT ILLNESS  Sabino Stafford is a 60 y.o. female. HPI  Hyperlipidemia:  Cardiovascular risk analysis - 60 y.o. female LDL goal is under 130. ROS: taking medications as instructed, no medication side effects noted, no TIA's, no chest pain on exertion, no dyspnea on exertion, no swelling of ankles. Tolerating meds, no myalgias or other side effects noted. New concerns: Pt's last LDL was 120.4 on 1/03/20. Continues on Lipitor 10 mg. Hypertension ROS: taking medications as instructed, no medication side effects noted, no TIA's, no chest pain on exertion, no dyspnea on exertion, no swelling of ankles. New concerns: BP in office today is 111/68. Continues on Benicar 40 mg.     Flu-like sx: She started to experience sx on Thursday. She had a low grade fever, minor chills, congestion, and cough. She had body aches and fever after her Shingrix vaccine and thinks that caused her sx. Confirms she received flu vaccine. She is taking Ibuprofen and Robitussin with minor relief in her cough. Prediabetes: Pt's last a1c was 5.8 on 1/03/20. She notes that she had the Shingrix vaccine last Wednesday. Review of Systems   Constitutional: Positive for chills and fever (low grade). HENT: Positive for congestion. Respiratory: Positive for cough. All other systems reviewed and are negative. Physical Exam  Constitutional:       Appearance: Normal appearance. HENT:      Right Ear: Hearing, tympanic membrane and external ear normal.      Left Ear: Hearing, tympanic membrane and external ear normal.      Mouth/Throat:      Mouth: Mucous membranes are moist.      Pharynx: Oropharynx is clear. Cardiovascular:      Rate and Rhythm: Normal rate and regular rhythm. Pulmonary:      Effort: Pulmonary effort is normal.      Breath sounds: Normal breath sounds and air entry. Musculoskeletal: Normal range of motion. Skin:     General: Skin is warm and dry.    Neurological:      General: No focal deficit present. Mental Status: She is alert and oriented to person, place, and time. Psychiatric:         Mood and Affect: Mood normal.         Behavior: Behavior normal.         ASSESSMENT and PLAN  Diagnoses and all orders for this visit:    1. Flu-like symptoms  Prescribed Tessalon Perles and Zpak. Discussed with pt that her lungs sound clear, but her sx are not bacterial. Informed pt that her sx were likely viral which can take 7-10 days to dissipate. Informed pt that it is the trend of this season to have a lingering cough that lasts anywhere from 4-6 weeks. Advised pt to only take the abx if she continues to experience sx in 48-72h or she starts to experience f/c or a productive cough with thick mucus. Advised pt to f/u if she experiences worsening of sx despite complying with abx. Wrote a work for pt to be excused from work until 2/06.   -     benzonatate (TESSALON) 200 mg capsule; Take 1 Cap by mouth three (3) times daily as needed for Cough for up to 7 days. -     azithromycin (ZITHROMAX) 250 mg tablet; Take 1 Tab by mouth See Admin Instructions for 5 days. 2. Essential hypertension, benign  BP is at goal with Benicar 40 mg. I do not recommend any change in medications. Lab results and schedule of future lab studies reviewed with patient. Reviewed diet, exercise and weight control. Written by Jeana Armstrong, as dictated by Aaliyah Espinal MD.     Current diagnosis and concerns discussed with pt at length. Understands risks and benefits or current treatment plan and medications and accepts the treatment and medication with any possible risks. Pt asks appropriate questions which were answered. Pt instructed to call with any concerns or problems. This note will not be viewable in [address]

## 2020-03-22 RX ORDER — OLMESARTAN MEDOXOMIL 40 MG/1
TABLET ORAL
Qty: 90 TAB | Refills: 1 | Status: SHIPPED | OUTPATIENT
Start: 2020-03-22 | End: 2020-09-15

## 2020-06-29 ENCOUNTER — VIRTUAL VISIT (OUTPATIENT)
Dept: INTERNAL MEDICINE CLINIC | Age: 65
End: 2020-06-29

## 2020-06-29 DIAGNOSIS — E78.5 HYPERLIPIDEMIA, UNSPECIFIED HYPERLIPIDEMIA TYPE: ICD-10-CM

## 2020-06-29 DIAGNOSIS — I10 ESSENTIAL HYPERTENSION, BENIGN: Primary | ICD-10-CM

## 2020-06-29 DIAGNOSIS — R73.03 PREDIABETES: ICD-10-CM

## 2020-06-29 DIAGNOSIS — R19.5 POSITIVE COLORECTAL CANCER SCREENING USING COLOGUARD TEST: ICD-10-CM

## 2020-06-29 NOTE — PROGRESS NOTES
HISTORY OF PRESENT ILLNESS  Hobart Nyhan is a 63 y.o. female who is present, aware, and consenting for real-time synchronous virtual video visit through [employer]. HPI  Hypertension ROS: taking medications as instructed, no medication side effects noted, no TIA's, no chest pain on exertion, no dyspnea on exertion, no swelling of ankles. New concerns:  Continues on Benicar. Pt notes that she has resumed exercising. Denies CP or SOB. Hyperlipidemia:  Cardiovascular risk analysis - 63 y.o. female LDL goal is under 130. ROS: taking medications as instructed, no medication side effects noted, no TIA's, no chest pain on exertion, no dyspnea on exertion, no swelling of ankles. Tolerating meds, no myalgias or other side effects noted. New concerns: Pt's last LDL was 120 on 1/3/20. Continues on Lipitor. Health maintenance: Pt notes that she does not want to get a colonoscopy right now. Pt continues to work at [employer]. Review of Systems   All other systems reviewed and are negative. Physical Exam  Vitals signs reviewed. Constitutional:       General: She is not in acute distress. Appearance: Normal appearance. She is not ill-appearing, toxic-appearing or diaphoretic. HENT:      Right Ear: Hearing normal.      Left Ear: Hearing normal.      Nose: Nose normal.      Mouth/Throat:      Mouth: Mucous membranes are moist.      Pharynx: Oropharynx is clear. Eyes:      Conjunctiva/sclera: Conjunctivae normal.   Neck:      Musculoskeletal: Normal range of motion. Pulmonary:      Effort: No respiratory distress. Breath sounds: Normal air entry. Musculoskeletal: Normal range of motion. Skin:     General: Skin is warm and dry. Neurological:      General: No focal deficit present. Mental Status: She is alert and oriented to person, place, and time. Mental status is at baseline.    Psychiatric:         Mood and Affect: Mood normal.         Behavior: Behavior normal. Thought Content: Thought content normal.         Judgment: Judgment normal.         ASSESSMENT and PLAN  Diagnoses and all orders for this visit:    1. Essential hypertension, benign  BP is at goal. I do not recommend any change in medications. 2. Hyperlipidemia, unspecified hyperlipidemia type  Presumed stable, will recheck labs soon. Patient is tolerating medications with no myalgias. I do not recommend any change in treatment plan. -     METABOLIC PANEL, COMPREHENSIVE; Future  -     LIPID PANEL; Future    3. Positive colorectal cancer screening using Cologuard test  Discussed with pt importance of getting regular colonoscopies. 4. Prediabetes  Sugars presumed stable, will recheck soon. Continue to exercise and monitor diet.   -     HEMOGLOBIN A1C WITH EAG; Future    Lab results and schedule of future lab studies reviewed with patient. Reviewed diet, exercise and weight control. Written by Freddy Huerta, as dictated by Rosalba Ahumada, MD.     Current diagnosis and concerns discussed with pt at length. Understands risks and benefits or current treatment plan and medications and accepts the treatment and medication with any possible risks. Pt asks appropriate questions which were answered. Pt instructed to call with any concerns or problems.

## 2020-07-22 ENCOUNTER — HOSPITAL ENCOUNTER (OUTPATIENT)
Dept: LAB | Age: 65
Discharge: HOME | End: 2020-07-22

## 2020-07-22 DIAGNOSIS — E78.5 HYPERLIPIDEMIA, UNSPECIFIED HYPERLIPIDEMIA TYPE: ICD-10-CM

## 2020-07-22 DIAGNOSIS — R73.03 DIABETES MELLITUS, LATENT: ICD-10-CM

## 2020-07-22 LAB
ALBUMIN SERPL-MCNC: 4.2 G/DL (ref 3.5–5)
ALBUMIN/GLOB SERPL: 1.2 {RATIO} (ref 1.1–2.2)
ALP SERPL-CCNC: 67 U/L (ref 45–117)
ALT SERPL-CCNC: 19 U/L (ref 12–78)
ANION GAP SERPL CALC-SCNC: 6 MMOL/L (ref 5–15)
AST SERPL-CCNC: 16 U/L (ref 15–37)
BILIRUB SERPL-MCNC: 0.5 MG/DL (ref 0.2–1)
BUN SERPL-MCNC: 13 MG/DL (ref 6–20)
BUN/CREAT SERPL: 21 (ref 12–20)
CALCIUM SERPL-MCNC: 9.3 MG/DL (ref 8.5–10.1)
CHLORIDE SERPL-SCNC: 98 MMOL/L (ref 97–108)
CHOLEST SERPL-MCNC: 225 MG/DL
CO2 SERPL-SCNC: 28 MMOL/L (ref 21–32)
COMMENT, HOLDF: NORMAL
CREAT SERPL-MCNC: 0.61 MG/DL (ref 0.55–1.02)
EST. AVERAGE GLUCOSE BLD GHB EST-MCNC: 114 MG/DL
GLOBULIN SER CALC-MCNC: 3.6 G/DL (ref 2–4)
GLUCOSE SERPL-MCNC: 102 MG/DL (ref 65–100)
HBA1C MFR BLD: 5.6 % (ref 4–5.6)
HDLC SERPL-MCNC: 91 MG/DL
HDLC SERPL: 2.5 {RATIO} (ref 0–5)
LDLC SERPL CALC-MCNC: 113 MG/DL (ref 0–100)
LIPID PROFILE,FLP: ABNORMAL
POTASSIUM SERPL-SCNC: 5.1 MMOL/L (ref 3.5–5.1)
PROT SERPL-MCNC: 7.8 G/DL (ref 6.4–8.2)
SAMPLES BEING HELD,HOLD: NORMAL
SODIUM SERPL-SCNC: 132 MMOL/L (ref 136–145)
TRIGL SERPL-MCNC: 105 MG/DL (ref ?–150)
VLDLC SERPL CALC-MCNC: 21 MG/DL

## 2020-08-25 ENCOUNTER — TELEPHONE (OUTPATIENT)
Dept: INTERNAL MEDICINE CLINIC | Age: 65
End: 2020-08-25

## 2020-08-25 NOTE — TELEPHONE ENCOUNTER
Pt wants to speak with dr Davide Bailon, I advised she is in clinic/ re: stated she want to talk about her health.  Please call verf number

## 2020-08-25 NOTE — TELEPHONE ENCOUNTER
Spoke with patient and she states that on Saturday she had an episode of dizziness and now the right side of her body feels \"unstable\". She said it's hard to explain the feeling, it is not weakness or numbness. Pt was requesting a referral to a neurologist.  Samson Huge pt she needs to be seen to be evaluated first.  Provided appt 8/26/20 at 7:45am.  Pt understood and was thankful for the call.

## 2020-08-26 ENCOUNTER — OFFICE VISIT (OUTPATIENT)
Dept: INTERNAL MEDICINE CLINIC | Age: 65
End: 2020-08-26
Payer: COMMERCIAL

## 2020-08-26 ENCOUNTER — HOSPITAL ENCOUNTER (EMERGENCY)
Age: 65
Discharge: HOME | End: 2020-08-26
Attending: EMERGENCY MEDICINE
Payer: COMMERCIAL

## 2020-08-26 ENCOUNTER — APPOINTMENT (OUTPATIENT)
Dept: CT IMAGING | Age: 65
End: 2020-08-26
Attending: EMERGENCY MEDICINE
Payer: COMMERCIAL

## 2020-08-26 VITALS
HEART RATE: 75 BPM | BODY MASS INDEX: 20.58 KG/M2 | OXYGEN SATURATION: 100 % | HEIGHT: 61 IN | DIASTOLIC BLOOD PRESSURE: 68 MMHG | SYSTOLIC BLOOD PRESSURE: 121 MMHG | WEIGHT: 109 LBS | RESPIRATION RATE: 16 BRPM | TEMPERATURE: 98.8 F

## 2020-08-26 VITALS
BODY MASS INDEX: 21.31 KG/M2 | TEMPERATURE: 98 F | OXYGEN SATURATION: 99 % | DIASTOLIC BLOOD PRESSURE: 77 MMHG | WEIGHT: 112.8 LBS | RESPIRATION RATE: 16 BRPM | SYSTOLIC BLOOD PRESSURE: 129 MMHG | HEART RATE: 74 BPM

## 2020-08-26 DIAGNOSIS — R42 DIZZINESS: ICD-10-CM

## 2020-08-26 DIAGNOSIS — R53.1 WEAKNESS: Primary | ICD-10-CM

## 2020-08-26 DIAGNOSIS — R26.89 BALANCE DISORDER: ICD-10-CM

## 2020-08-26 DIAGNOSIS — G44.89 OTHER HEADACHE SYNDROME: ICD-10-CM

## 2020-08-26 DIAGNOSIS — R53.1 RIGHT SIDED WEAKNESS: Primary | ICD-10-CM

## 2020-08-26 DIAGNOSIS — R47.81 SLURRED SPEECH: ICD-10-CM

## 2020-08-26 LAB
ALBUMIN SERPL-MCNC: 3.8 G/DL (ref 3.5–5)
ALBUMIN/GLOB SERPL: 1 {RATIO} (ref 1.1–2.2)
ALP SERPL-CCNC: 53 U/L (ref 45–117)
ALT SERPL-CCNC: 13 U/L (ref 12–78)
ANION GAP SERPL CALC-SCNC: 8 MMOL/L (ref 5–15)
AST SERPL-CCNC: 14 U/L (ref 15–37)
BASOPHILS # BLD: 0 K/UL (ref 0–0.1)
BASOPHILS NFR BLD: 1 % (ref 0–1)
BILIRUB SERPL-MCNC: 0.4 MG/DL (ref 0.2–1)
BUN SERPL-MCNC: 15 MG/DL (ref 6–20)
BUN/CREAT SERPL: 26 (ref 12–20)
CALCIUM SERPL-MCNC: 8.7 MG/DL (ref 8.5–10.1)
CHLORIDE SERPL-SCNC: 98 MMOL/L (ref 97–108)
CO2 SERPL-SCNC: 25 MMOL/L (ref 21–32)
COMMENT, HOLDF: NORMAL
CREAT SERPL-MCNC: 0.58 MG/DL (ref 0.55–1.02)
DIFFERENTIAL METHOD BLD: ABNORMAL
EOSINOPHIL # BLD: 0.1 K/UL (ref 0–0.4)
EOSINOPHIL NFR BLD: 3 % (ref 0–7)
ERYTHROCYTE [DISTWIDTH] IN BLOOD BY AUTOMATED COUNT: 11.4 % (ref 11.5–14.5)
GLOBULIN SER CALC-MCNC: 3.7 G/DL (ref 2–4)
GLUCOSE SERPL-MCNC: 129 MG/DL (ref 65–100)
HCT VFR BLD AUTO: 33.8 % (ref 35–47)
HGB BLD-MCNC: 12 G/DL (ref 11.5–16)
IMM GRANULOCYTES # BLD AUTO: 0 K/UL (ref 0–0.04)
IMM GRANULOCYTES NFR BLD AUTO: 0 % (ref 0–0.5)
LYMPHOCYTES # BLD: 1.2 K/UL (ref 0.8–3.5)
LYMPHOCYTES NFR BLD: 23 % (ref 12–49)
MAGNESIUM SERPL-MCNC: 2.3 MG/DL (ref 1.6–2.4)
MCH RBC QN AUTO: 30.7 PG (ref 26–34)
MCHC RBC AUTO-ENTMCNC: 35.5 G/DL (ref 30–36.5)
MCV RBC AUTO: 86.4 FL (ref 80–99)
MONOCYTES # BLD: 0.4 K/UL (ref 0–1)
MONOCYTES NFR BLD: 8 % (ref 5–13)
NEUTS SEG # BLD: 3.4 K/UL (ref 1.8–8)
NEUTS SEG NFR BLD: 65 % (ref 32–75)
NRBC # BLD: 0 K/UL (ref 0–0.01)
NRBC BLD-RTO: 0 PER 100 WBC
PLATELET # BLD AUTO: 259 K/UL (ref 150–400)
PMV BLD AUTO: 8.6 FL (ref 8.9–12.9)
POTASSIUM SERPL-SCNC: 4.4 MMOL/L (ref 3.5–5.1)
PROT SERPL-MCNC: 7.5 G/DL (ref 6.4–8.2)
RBC # BLD AUTO: 3.91 M/UL (ref 3.8–5.2)
SAMPLES BEING HELD,HOLD: NORMAL
SODIUM SERPL-SCNC: 131 MMOL/L (ref 136–145)
WBC # BLD AUTO: 5.2 K/UL (ref 3.6–11)

## 2020-08-26 PROCEDURE — 99215 OFFICE O/P EST HI 40 MIN: CPT | Performed by: INTERNAL MEDICINE

## 2020-08-26 PROCEDURE — 83735 ASSAY OF MAGNESIUM: CPT

## 2020-08-26 PROCEDURE — 36415 COLL VENOUS BLD VENIPUNCTURE: CPT

## 2020-08-26 PROCEDURE — 70450 CT HEAD/BRAIN W/O DYE: CPT

## 2020-08-26 PROCEDURE — 80053 COMPREHEN METABOLIC PANEL: CPT

## 2020-08-26 PROCEDURE — 85025 COMPLETE CBC W/AUTO DIFF WBC: CPT

## 2020-08-26 PROCEDURE — 99284 EMERGENCY DEPT VISIT MOD MDM: CPT

## 2020-08-26 RX ORDER — ASCORBIC ACID 250 MG
TABLET ORAL
COMMUNITY

## 2020-08-26 RX ORDER — BISMUTH SUBSALICYLATE 262 MG
1 TABLET,CHEWABLE ORAL DAILY
COMMUNITY

## 2020-08-26 NOTE — PROGRESS NOTES
HISTORY OF PRESENT ILLNESS  Levon Burnette is a 63 y.o. female presenting with her . HPI  Hypertension ROS: taking medications as instructed, no medication side effects noted, no chest pain on exertion, no dyspnea on exertion, no swelling of ankles. New concerns: BP in office today is 129/77. Hyperlipidemia:  Cardiovascular risk analysis - 63 y.o. female LDL goal is under 100. ROS: taking medications as instructed, no medication side effects noted, no chest pain on exertion, no dyspnea on exertion, no swelling of ankles. Tolerating meds, no myalgias or other side effects noted. New concerns: Pt's last LDL was 113 on 7/22/20. R side weakness/slurred speech: Pt reports that when she got off work on Saturday, she was cooking at 5 am and then she started sweating and felt very dizzy and lightheaded. She notes that she lost control of her bowels and had to run to the bathroom. Pt states that she sat down after using the restroom and then felt fine for the rest of the day. Pt's  states that pt had garbled speech on Saturday and Sunday. Pt's  noticed that pt is slower during their regular walks starting on Saturday. Pt reports that the following Monday she returned to work and felt very unsteady while driving and had to concentrate very hard on her steering. She notes that when walking, her R side feels unsteady. Pt's  states that pt has been urinating more often. She notes that she continued to feel unsteady yesterday and slept most of the day. Denies abnormal gait, nausea, SOB, CP, or vomiting. Confirms change in handwriting. Of note, pt had HA all of last week which she managed with Motrin. Review of Systems   Genitourinary: Positive for frequency. Neurological: Positive for dizziness, speech change, weakness and headaches. All other systems reviewed and are negative. Physical Exam  Vitals signs and nursing note reviewed.    Constitutional:       Appearance: Normal appearance. She is normal weight. HENT:      Head: Normocephalic and atraumatic. Right Ear: Tympanic membrane, ear canal and external ear normal.      Left Ear: Tympanic membrane, ear canal and external ear normal.      Nose: Nose normal.      Mouth/Throat:      Mouth: Mucous membranes are dry. Pharynx: Oropharynx is clear. Neck:      Musculoskeletal: Normal range of motion and neck supple. Cardiovascular:      Rate and Rhythm: Normal rate and regular rhythm. Pulses: Normal pulses. Heart sounds: Normal heart sounds. Pulmonary:      Effort: Pulmonary effort is normal.      Breath sounds: Normal breath sounds. Abdominal:      General: Abdomen is flat. Palpations: Abdomen is soft. Musculoskeletal: Normal range of motion. Skin:     General: Skin is warm and dry. Neurological:      General: No focal deficit present. Mental Status: She is alert and oriented to person, place, and time. Mental status is at baseline. Motor: Weakness (R arm strength and ) present. Psychiatric:         Mood and Affect: Mood normal.         Behavior: Behavior normal.         Thought Content: Thought content normal.         Judgment: Judgment normal.         ASSESSMENT and PLAN    R sided weakness: Pt presents with unclear speech, R side weakness, and instability since Saturday. Informed pt that finding the etiology of her sxs requires an acute neural work-up and that I do not feel comfortable ordering outpatient testing. Discussed with pt that a MRI alone is not sufficient to ensure that her condition is properly addressed. Had serious conversation with pt that she needs to go to the ER today since her sxs are not resolved. Pt is very reluctant about going to the ER and I told her that it would be against medical advice to not go to the ER. Will continue to monitor for improvements or changes. Headache: Pt had a HA at the end of last week.  Informed pt that finding the etiology of her sxs requires an acute neural work-up and that I do not feel comfortable ordering outpatient testing. Discussed with pt that a MRI alone is not sufficient to ensure that her condition is properly addressed. Had serious conversation with pt that she needs to go to the ER today since her sxs are not resolved. Pt is very reluctant about going to the ER and I told her that it would be against medical advice to not go to the ER. Will continue to monitor for improvements or changes. Lab results and schedule of future lab studies reviewed with patient. Reviewed diet, exercise and weight control. Written by Sal Ortiz, as dictated by Brandon Mccrary MD.     Current diagnosis and concerns discussed with pt at length. Understands risks and benefits or current treatment plan and medications and accepts the treatment and medication with any possible risks. Pt asks appropriate questions which were answered. Pt instructed to call with any concerns or problems.

## 2020-08-26 NOTE — DISCHARGE INSTRUCTIONS
Patient Education        Dizziness: Care Instructions  Your Care Instructions  Dizziness is the feeling of unsteadiness or fuzziness in your head. It is different than having vertigo, which is a feeling that the room is spinning or that you are moving or falling. It is also different from lightheadedness, which is the feeling that you are about to faint. It can be hard to know what causes dizziness. Some people feel dizzy when they have migraine headaches. Sometimes bouts of flu can make you feel dizzy. Some medical conditions, such as heart problems or high blood pressure, can make you feel dizzy. Many medicines can cause dizziness, including medicines for high blood pressure, pain, or anxiety. If a medicine causes your symptoms, your doctor may recommend that you stop or change the medicine. If it is a problem with your heart, you may need medicine to help your heart work better. If there is no clear reason for your symptoms, your doctor may suggest watching and waiting for a while to see if the dizziness goes away on its own. Follow-up care is a key part of your treatment and safety. Be sure to make and go to all appointments, and call your doctor if you are having problems. It's also a good idea to know your test results and keep a list of the medicines you take. How can you care for yourself at home? · If your doctor recommends or prescribes medicine, take it exactly as directed. Call your doctor if you think you are having a problem with your medicine. · Do not drive while you feel dizzy. · Try to prevent falls. Steps you can take include:  ? Using nonskid mats, adding grab bars near the tub, and using night-lights. ? Clearing your home so that walkways are free of anything you might trip on.  ? Letting family and friends know that you have been feeling dizzy. This will help them know how to help you. When should you call for help? [id number] anytime you think you may need emergency care.  For example, call if:  · You passed out (lost consciousness). · You have dizziness along with symptoms of a heart attack. These may include:  ? Chest pain or pressure, or a strange feeling in the chest.  ? Sweating. ? Shortness of breath. ? Nausea or vomiting. ? Pain, pressure, or a strange feeling in the back, neck, jaw, or upper belly or in one or both shoulders or arms. ? Lightheadedness or sudden weakness. ? A fast or irregular heartbeat. · You have symptoms of a stroke. These may include:  ? Sudden numbness, tingling, weakness, or loss of movement in your face, arm, or leg, especially on only one side of your body. ? Sudden vision changes. ? Sudden trouble speaking. ? Sudden confusion or trouble understanding simple statements. ? Sudden problems with walking or balance. ? A sudden, severe headache that is different from past headaches. Call your doctor now or seek immediate medical care if:  · You feel dizzy and have a fever, headache, or ringing in your ears. · You have new or increased nausea and vomiting. · Your dizziness does not go away or comes back. Watch closely for changes in your health, and be sure to contact your doctor if:  · You do not get better as expected. Where can you learn more? Go to http://césar-nacho.info/  Enter Q823 in the search box to learn more about \"Dizziness: Care Instructions. \"  Current as of: June 26, 2019               Content Version: 12.5  © 8509-4762 [employer]. Care instructions adapted under license by [employer] (which disclaims liability or warranty for this information). If you have questions about a medical condition or this instruction, always ask your healthcare professional. [place] [address] any warranty or liability for your use of this information. Patient Education        Weakness: Care Instructions  Your Care Instructions     Weakness is a lack of physical or muscle strength.  You may feel that you need to make extra effort to move your arms, legs, or other muscles. Generalized weakness means that you feel weak in most areas of your body. Another type of weakness may affect just one muscle or group of muscles. You may feel weak and tired after you have done too much activity, such as taking an extra-long hike. This is not a serious problem. It often goes away on its own. Feeling weak can also be caused by medical conditions like thyroid problems, depression, or a virus. Sometimes the cause can be serious. Your doctor may want to do more tests to try to find the cause of the weakness. The doctor has checked you carefully, but problems can develop later. If you notice any problems or new symptoms, get medical treatment right away. Follow-up care is a key part of your treatment and safety. Be sure to make and go to all appointments, and call your doctor if you are having problems. It's also a good idea to know your test results and keep a list of the medicines you take. How can you care for yourself at home? · Rest when you feel tired. · Be safe with medicines. If your doctor prescribed medicine, take it exactly as prescribed. Call your doctor if you think you are having a problem with your medicine. You will get more details on the specific medicines your doctor prescribes. · Do not skip meals. Eating a balanced diet may increase your energy level. · Get some physical activity every day, but do not get too tired. When should you call for help? Call your doctor now or seek immediate medical care if:  · You have new or worse weakness. · You are dizzy or lightheaded, or you feel like you may faint. Watch closely for changes in your health, and be sure to contact your doctor if:  · You do not get better as expected. Where can you learn more? Go to http://césar-nacho.info/  Enter V492 in the search box to learn more about \"Weakness: Care Instructions. \"  Current as of: June 26, 2019               Content Version: 12.5  © 2290-0398 [employer]. Care instructions adapted under license by [employer] (which disclaims liability or warranty for this information). If you have questions about a medical condition or this instruction, always ask your healthcare professional. [address] any warranty or liability for your use of this information.

## 2020-08-26 NOTE — ED PROVIDER NOTES
HPI     Pt is a 63 y.o. F with PMH of HTN, HLD here with c/o right side weakness intermittently x 5 days. She says she only notices it when she is using her arm or leg. She describes her leg weakness as feeling slower. She feels when she writes with her right hand she feels it is not her normal writing. Prior to onset of sx she had diaphoresis and dizziness. She denies dizziness at this time. She had had headache last week x 3 days but then it subsided and she has not had any issues since that time. No other complaints at this time. Past Medical History:   Diagnosis Date    Essential hypertension, benign 6/17/2015    Other and unspecified hyperlipidemia 11/10/2009    Other and unspecified hyperlipidemia 11/10/2009       No past surgical history on file. No family history on file.     Social History     Socioeconomic History    Marital status:      Spouse name: Not on file    Number of children: Not on file    Years of education: Not on file    Highest education level: Not on file   Occupational History    Not on file   Social Needs    Financial resource strain: Not on file    Food insecurity     Worry: Not on file     Inability: Not on file    Transportation needs     Medical: Not on file     Non-medical: Not on file   Tobacco Use    Smoking status: Never Smoker    Smokeless tobacco: Never Used   Substance and Sexual Activity    Alcohol use: No    Drug use: No    Sexual activity: Not on file   Lifestyle    Physical activity     Days per week: Not on file     Minutes per session: Not on file    Stress: Not on file   Relationships    Social connections     Talks on phone: Not on file     Gets together: Not on file     Attends Shinto service: Not on file     Active member of club or organization: Not on file     Attends meetings of clubs or organizations: Not on file     Relationship status: Not on file    Intimate partner violence     Fear of current or ex partner: Not on file     Emotionally abused: Not on file     Physically abused: Not on file     Forced sexual activity: Not on file   Other Topics Concern    Not on file   Social History Narrative    Not on file         ALLERGIES: Patient has no known allergies. Review of Systems   Constitutional: Negative for chills, diaphoresis and fever. HENT: Negative for congestion and trouble swallowing. Eyes: Negative for photophobia and visual disturbance. Respiratory: Negative for cough, chest tightness and shortness of breath. Cardiovascular: Negative for chest pain, palpitations and leg swelling. Gastrointestinal: Negative for abdominal pain, diarrhea, nausea and vomiting. Genitourinary: Negative for difficulty urinating, dysuria, flank pain and frequency. Musculoskeletal: Negative for back pain, gait problem and myalgias. Skin: Negative for rash and wound. Neurological: Positive for dizziness, weakness and headaches. Negative for light-headedness. Hematological: Negative for adenopathy. Does not bruise/bleed easily. Psychiatric/Behavioral: Negative for agitation and confusion. All other systems reviewed and are negative. Vitals:    08/26/20 0842   BP: 149/85   Pulse: 75   Resp: 16   Temp: 98.8 °F (37.1 °C)   SpO2: 100%   Weight: 49.4 kg (109 lb)   Height: 5' 1\" (1.549 m)            Physical Exam  Vitals signs and nursing note reviewed. Constitutional:       General: She is not in acute distress. Appearance: She is well-developed. She is not diaphoretic. HENT:      Head: Normocephalic. Eyes:      Conjunctiva/sclera: Conjunctivae normal.      Pupils: Pupils are equal, round, and reactive to light. Neck:      Musculoskeletal: Normal range of motion and neck supple. Vascular: No JVD. Cardiovascular:      Rate and Rhythm: Normal rate and regular rhythm. Heart sounds: Normal heart sounds. Pulmonary:      Effort: Pulmonary effort is normal.      Breath sounds: Normal breath sounds. Abdominal:      General: Bowel sounds are normal. There is no distension. Palpations: Abdomen is soft. Tenderness: There is no abdominal tenderness. Musculoskeletal: Normal range of motion. General: No tenderness or deformity. Lymphadenopathy:      Cervical: No cervical adenopathy. Skin:     General: Skin is warm and dry. Capillary Refill: Capillary refill takes less than 2 seconds. Findings: No erythema or rash. Neurological:      Mental Status: She is alert and oriented to person, place, and time. Cranial Nerves: No cranial nerve deficit. Sensory: No sensory deficit. MDM       Procedures    9:12 AM  NIH score 0    10:37 AM  Patient's results have been reviewed with them. Patient and/or family have verbally conveyed their understanding and agreement of the patient's signs, symptoms, diagnosis, treatment and prognosis and additionally agree to follow up as recommended or return to the Emergency Room should their condition change prior to follow-up. Discharge instructions have also been provided to the patient with some educational information regarding their diagnosis as well a list of reasons why they would want to return to the ER prior to their follow-up appointment should their condition change.     Judy Murillo MD

## 2020-08-26 NOTE — ED TRIAGE NOTES
\"Right side is unsteady when she drives\". Right arm and leg weak since last Saturday. Also complains of dizziness and sweating before the weakness started. The weakness is intermittent and mainly when she is using it.

## 2020-08-27 ENCOUNTER — TELEPHONE (OUTPATIENT)
Dept: INTERNAL MEDICINE CLINIC | Age: 65
End: 2020-08-27

## 2020-08-27 NOTE — TELEPHONE ENCOUNTER
----- Message from [address] [place] [address]. Funalejandrolar sent at 8/27/2020  8:17 AM EDT -----  Regarding: RE: Referral Request  Contact: [phone]  Im unable to get any appointment if you dont send them a referral that i should be seen anytime soon. Please just   Send your referral to any doctor you want.

## 2020-08-27 NOTE — TELEPHONE ENCOUNTER
Spoke with neurology and obtained appt with Dr. Rudy Nelson 9/1/20 at 8:00am with a 7:30am arrival.    Left v/m for patient with appt details.

## 2020-08-27 NOTE — TELEPHONE ENCOUNTER
Please call and get her in with [facility] neurology for headache, right sided weakness, imbalance- slurred speech?  She went to ER and neg CT but needs neurology eval sooner than later- she is a nurse at [employer]

## 2020-08-31 DIAGNOSIS — E78.5 HYPERLIPIDEMIA, UNSPECIFIED HYPERLIPIDEMIA TYPE: ICD-10-CM

## 2020-08-31 RX ORDER — ATORVASTATIN CALCIUM 10 MG/1
TABLET, FILM COATED ORAL
Qty: 90 TAB | Refills: 0 | Status: SHIPPED | OUTPATIENT
Start: 2020-08-31 | End: 2020-09-03 | Stop reason: DRUGHIGH

## 2020-09-01 ENCOUNTER — HOSPITAL ENCOUNTER (OUTPATIENT)
Dept: MRI IMAGING | Age: 65
Discharge: HOME | End: 2020-09-01
Attending: PSYCHIATRY & NEUROLOGY
Payer: COMMERCIAL

## 2020-09-01 ENCOUNTER — OFFICE VISIT (OUTPATIENT)
Dept: NEUROLOGY | Age: 65
End: 2020-09-01
Payer: COMMERCIAL

## 2020-09-01 VITALS
OXYGEN SATURATION: 99 % | RESPIRATION RATE: 16 BRPM | WEIGHT: 109 LBS | TEMPERATURE: 97.2 F | HEIGHT: 61 IN | BODY MASS INDEX: 20.58 KG/M2 | HEART RATE: 68 BPM | DIASTOLIC BLOOD PRESSURE: 60 MMHG | SYSTOLIC BLOOD PRESSURE: 116 MMHG

## 2020-09-01 DIAGNOSIS — R51.9 ACUTE NONINTRACTABLE HEADACHE, UNSPECIFIED HEADACHE TYPE: ICD-10-CM

## 2020-09-01 DIAGNOSIS — R51.9 ACUTE NONINTRACTABLE HEADACHE, UNSPECIFIED HEADACHE TYPE: Primary | ICD-10-CM

## 2020-09-01 PROCEDURE — 99245 OFF/OP CONSLTJ NEW/EST HI 55: CPT | Performed by: PSYCHIATRY & NEUROLOGY

## 2020-09-01 PROCEDURE — 70551 MRI BRAIN STEM W/O DYE: CPT

## 2020-09-01 NOTE — PROGRESS NOTES
NEUROLOGY NEW PATIENT OFFICE CONSULTATION      9/1/2020    RE: Johanna Lu         1955      REFERRED BY:  Mary Singleton MD        CHIEF COMPLAINT:  This is Johanna Lu is a 63 y.o. female right handed [address] [place] [address] Dr doctor who had concerns including New Patient (Seen in the ER 3 days ago for she felt dizzy, broke out into sweats, loss bowel control. Still having R sided weakness. BTW Monday and is scared to drive.). HPI:     On 8/26/2020, patient went to [facility] ER for an event where she had generalized headache 4/10, on and off for 3-4 days then had diaphoresis and dizzy. Since then, patient noted unsteadiness described as R sided weakness for 5 days. Patient was offered admission, but patient declined. (+) stress with selling house. Takes ASA 81 every day. She increased it to 325 on her own accord. ROS   (-) fever  (-) rash  All other systems reviewed and are negative    Past Medical Hx  Past Medical History:   Diagnosis Date    Essential hypertension, benign 6/17/2015    Other and unspecified hyperlipidemia 11/10/2009    Other and unspecified hyperlipidemia 11/10/2009       Social Hx  Social History     Socioeconomic History    Marital status:      Spouse name: Not on file    Number of children: Not on file    Years of education: Not on file    Highest education level: Not on file   Tobacco Use    Smoking status: Never Smoker    Smokeless tobacco: Never Used   Substance and Sexual Activity    Alcohol use: No    Drug use: No       Family Hx  No family history on file. Stroke - parents    ALLERGIES  No Known Allergies    CURRENT MEDS  Current Outpatient Medications   Medication Sig Dispense Refill    atorvastatin (LIPITOR) 10 mg tablet TAKE 1 TABLET BY MOUTH EVERY DAY 90 Tab 0    multivitamin (ONE A DAY) tablet Take 1 Tab by mouth daily.  ascorbic acid, vitamin C, (Vitamin C) 250 mg tablet Take  by mouth.       olmesartan (BENICAR) 40 mg tablet TAKE 1 TABLET BY MOUTH EVERY DAY 90 Tab 1    aspirin 81 mg chewable tablet Take 81 mg by mouth daily. PREVIOUS WORKUP: (reviewed)  IMAGING:    CT Results (recent):  Results from Hospital Encounter encounter on 08/26/20   CT HEAD WO CONT    Narrative INDICATION: dizziness, headache, right side feels \"unsteady and weak\" x 5 days  normal exam     Exam: Noncontrast CT of the brain is performed with 5 mm collimation. CT dose reduction was achieved with the use of the standardized protocol  tailored for this examination and automatic exposure control for dose  modulation. There is no prior study for direct comparison. FINDINGS: There is no acute intracranial hemorrhage, mass, mass effect or  herniation. Ventricular system is normal. The gray-white matter differentiation  is well-preserved. The mastoid air cells are well pneumatized. The visualized  paranasal sinuses are normal.      Impression IMPRESSION: No acute intracranial hemorrhage, mass or infarct. MRI Results (recent):  No results found for this or any previous visit. IR Results (recent):  No results found for this or any previous visit. VAS/US Results (recent):  No results found for this or any previous visit. LABS (reviewed)  Results for orders placed or performed during the hospital encounter of 08/26/20   CBC WITH AUTOMATED DIFF   Result Value Ref Range    WBC 5.2 3.6 - 11.0 K/uL    RBC 3.91 3.80 - 5.20 M/uL    HGB 12.0 11.5 - 16.0 g/dL    HCT 33.8 (L) 35.0 - 47.0 %    MCV 86.4 80.0 - 99.0 FL    MCH 30.7 26.0 - 34.0 PG    MCHC 35.5 30.0 - 36.5 g/dL    RDW 11.4 (L) 11.5 - 14.5 %    PLATELET 307 136 - 510 K/uL    MPV 8.6 (L) 8.9 - 12.9 FL    NRBC 0.0 0  WBC    ABSOLUTE NRBC 0.00 0.00 - 0.01 K/uL    NEUTROPHILS 65 32 - 75 %    LYMPHOCYTES 23 12 - 49 %    MONOCYTES 8 5 - 13 %    EOSINOPHILS 3 0 - 7 %    BASOPHILS 1 0 - 1 %    IMMATURE GRANULOCYTES 0 0.0 - 0.5 %    ABS. NEUTROPHILS 3.4 1.8 - 8.0 K/UL    ABS. LYMPHOCYTES 1.2 0.8 - 3.5 K/UL    ABS. MONOCYTES 0.4 0.0 - 1.0 K/UL    ABS. EOSINOPHILS 0.1 0.0 - 0.4 K/UL    ABS. BASOPHILS 0.0 0.0 - 0.1 K/UL    ABS. IMM. GRANS. 0.0 0.00 - 0.04 K/UL    DF AUTOMATED     METABOLIC PANEL, COMPREHENSIVE   Result Value Ref Range    Sodium 131 (L) 136 - 145 mmol/L    Potassium 4.4 3.5 - 5.1 mmol/L    Chloride 98 97 - 108 mmol/L    CO2 25 21 - 32 mmol/L    Anion gap 8 5 - 15 mmol/L    Glucose 129 (H) 65 - 100 mg/dL    BUN 15 6 - 20 MG/DL    Creatinine 0.58 0.55 - 1.02 MG/DL    BUN/Creatinine ratio 26 (H) 12 - 20      GFR est AA >60 >60 ml/min/1.73m2    GFR est non-AA >60 >60 ml/min/1.73m2    Calcium 8.7 8.5 - 10.1 MG/DL    Bilirubin, total 0.4 0.2 - 1.0 MG/DL    ALT (SGPT) 13 12 - 78 U/L    AST (SGOT) 14 (L) 15 - 37 U/L    Alk. phosphatase 53 45 - 117 U/L    Protein, total 7.5 6.4 - 8.2 g/dL    Albumin 3.8 3.5 - 5.0 g/dL    Globulin 3.7 2.0 - 4.0 g/dL    A-G Ratio 1.0 (L) 1.1 - 2.2     MAGNESIUM   Result Value Ref Range    Magnesium 2.3 1.6 - 2.4 mg/dL   SAMPLES BEING HELD   Result Value Ref Range    SAMPLES BEING HELD 1SST,1RED,1BLUE     COMMENT        Add-on orders for these samples will be processed based on acceptable specimen integrity and analyte stability, which may vary by analyte. Physical Exam:     Visit Vitals  /60 (BP 1 Location: Right arm, BP Patient Position: Sitting)   Pulse 68   Temp 97.2 °F (36.2 °C)   Resp 16   Ht 5' 1\" (1.549 m)   Wt 49.4 kg (109 lb)   SpO2 99%   BMI 20.60 kg/m²     General:  Alert, cooperative, no distress. Head:  Normocephalic, without obvious abnormality, atraumatic. Eyes:  Conjunctivae/corneas clear. Lungs:  Heart:   Non labored breathing  Regular rate and rhythm, no carotid bruits   Abdomen:   Soft, non-distended   Extremities: Extremities normal, atraumatic, no cyanosis or edema. Pulses: 2+ and symmetric all extremities. Skin: Skin color, texture, turgor normal. No rashes or lesions.   Neurologic Exam     Gen: Attention normal             Language: naming, repetition, fluency normal             Memory: intact recent and remote memory  Cranial Nerves:  I: smell Not tested   II: visual fields Full to confrontation   II: pupils Equal, round, reactive to light   II: optic disc No papilledema   III,VII: ptosis none   III,IV,VI: extraocular muscles  Full ROM   V: mastication normal   V: facial light touch sensation  normal   VII: facial muscle function   symmetric   VIII: hearing symmetric   IX: soft palate elevation  normal   XI: trapezius strength  5/5   XI: sternocleidomastoid strength 5/5   XI: neck flexion strength  5/5   XII: tongue  midline     Motor: normal bulk and tone, no tremor              Strength: 5/5 all four extremities  Sensory: intact to LT, PP, vibration, and JPS  Reflexes: 2+ throughout; Down going toes  Coordination: Good FTN and HTS  Gait: normal gait including tandem            Impression:     Zee Kim is a 63 y.o. [place] female who  has a past medical history of Essential hypertension, benign (6/17/2015), Other and unspecified hyperlipidemia (11/10/2009), and Other and unspecified hyperlipidemia (11/10/2009). who on 8/26/2020, patient went to [facility] ER for an event where she had generalized headache 4/10, on and off for 3-4 days then had diaphoresis and dizzy. Since then, patient noted unsteadiness described as R sided weakness for 5 days. Patient was offered admission, but patient declined. Takes ASA 81 every day. She increased it to 325 on her own accord. Considerations include stroke (family history of stroke) and complex migraine (stress with selling house and trying to go back to the [place] associated with initial headache). RECOMMENDATIONS  1. I had a long discussion with patient and . Discussed diagnosis, prognosis, pathophysiology and available treatment. Reviewed test results. All questions were answered. 2. Will do MRI brain to look for stroke. Patient to call for results  3. Patient already on  every day   4.  on Lipitor 10 mg every day . WIll increase it to 40 mg to keep LDL < 70 if MRI brain shows a stroke  5. Optimize medical management of HTN  6. Will consider further stroke work up depending on above        Follow-up and Dispositions    · Return for review of results.             Thank you for the consultation      Kaushik Rooney MD  Diplomate, American Board of Psychiatry and Neurology  Diplomate, Neuromuscular Medicine  Diplomate, American Board of Electrodiagnostic Medicine        CC: Brook Scott MD  Fax: [phone]

## 2020-09-01 NOTE — LETTER
9/1/20 Patient: Giuliana Hodge YOB: 1955 Date of Visit: 9/1/2020 Ahmet Rodríguez MD 
[place] [address] [address] [facility] [address] VIA In Basket Dear Ahmet Rodríguez MD, Thank you for referring Ms. Nigel Gtz to [facility] for evaluation. My notes for this consultation are attached. If you have questions, please do not hesitate to call me. I look forward to following your patient along with you. Sincerely, Nikko Swenson MD

## 2020-09-01 NOTE — PROGRESS NOTES
Chief Complaint   Patient presents with    New Patient     Seen in the ER 3 days ago for she felt dizzy, broke out into sweats, loss bowel control. Still having R sided weakness. BTW Monday and is scared to drive.      Visit Vitals  /60 (BP 1 Location: Right arm, BP Patient Position: Sitting)   Pulse 68   Temp 97.2 °F (36.2 °C)   Resp 16   Ht 5' 1\" (1.549 m)   Wt 49.4 kg (109 lb)   SpO2 99%   BMI 20.60 kg/m²

## 2020-09-02 NOTE — PROGRESS NOTES
MD Wagner Soriano, April M, LPN   Follow up to discuss (virtual visit)  results   9/2/20 Sent patient a xiao qu wu youhart message to schedule a vv appt tomorrow to discuss mri results.   Faraz

## 2020-09-03 ENCOUNTER — TELEPHONE (OUTPATIENT)
Dept: NEUROLOGY | Age: 65
End: 2020-09-03

## 2020-09-03 ENCOUNTER — VIRTUAL VISIT (OUTPATIENT)
Dept: NEUROLOGY | Age: 65
End: 2020-09-03
Payer: COMMERCIAL

## 2020-09-03 DIAGNOSIS — I63.9 CEREBROVASCULAR ACCIDENT (CVA), UNSPECIFIED MECHANISM (HCC): Primary | ICD-10-CM

## 2020-09-03 PROCEDURE — 99215 OFFICE O/P EST HI 40 MIN: CPT | Performed by: PSYCHIATRY & NEUROLOGY

## 2020-09-03 RX ORDER — ATORVASTATIN CALCIUM 40 MG/1
40 TABLET, FILM COATED ORAL DAILY
Qty: 30 TAB | Refills: 5 | Status: SHIPPED | OUTPATIENT
Start: 2020-09-03 | End: 2021-02-01

## 2020-09-03 NOTE — PROGRESS NOTES
Gregory Liu is a 63 y.o. female who was seen by synchronous (real-time) audio-video technology on 9/3/2020. Subjective:   Gregory Liu is a 63 y.o. Citizen of [place] female who  has a past medical history of Essential hypertension, benign (6/17/2015), Other and unspecified hyperlipidemia (11/10/2009), and Other and unspecified hyperlipidemia (11/10/2009). who on 8/26/2020, patient went to [facility] ER for an event where she had generalized headache 4/10, on and off for 3-4 days then had diaphoresis and dizzy. Since then, patient noted unsteadiness described as R sided weakness for 5 days. Patient was offered admission, but patient declined. Takes ASA 81 every day. She increased it to 325 on her own accord. Considerations include stroke (family history of stroke) and complex migraine (stress with selling house and trying to go back to the [place] associated with initial headache).     Patient doing better with no new event on . On Lipitor 10 mg every day.         ROS:  Per HPI-  Otherwise 12 point ROS was negative    Social Hx:  Social History     Socioeconomic History    Marital status:      Spouse name: Not on file    Number of children: Not on file    Years of education: Not on file    Highest education level: Not on file   Tobacco Use    Smoking status: Never Smoker    Smokeless tobacco: Never Used   Substance and Sexual Activity    Alcohol use: No    Drug use: No       Meds:  Current Outpatient Medications on File Prior to Visit   Medication Sig Dispense Refill    multivitamin (ONE A DAY) tablet Take 1 Tab by mouth daily.  ascorbic acid, vitamin C, (Vitamin C) 250 mg tablet Take  by mouth.  olmesartan (BENICAR) 40 mg tablet TAKE 1 TABLET BY MOUTH EVERY DAY 90 Tab 1    aspirin 81 mg chewable tablet Take 81 mg by mouth daily. No current facility-administered medications on file prior to visit.         Imaging:    CT Results (recent):  Results from [facility] ([place], [place]) encounter on 08/26/20   CT HEAD WO CONT    Narrative INDICATION: dizziness, headache, right side feels \"unsteady and weak\" x 5 days  normal exam     Exam: Noncontrast CT of the brain is performed with 5 mm collimation. CT dose reduction was achieved with the use of the standardized protocol  tailored for this examination and automatic exposure control for dose  modulation. There is no prior study for direct comparison. FINDINGS: There is no acute intracranial hemorrhage, mass, mass effect or  herniation. Ventricular system is normal. The gray-white matter differentiation  is well-preserved. The mastoid air cells are well pneumatized. The visualized  paranasal sinuses are normal.      Impression IMPRESSION: No acute intracranial hemorrhage, mass or infarct. MRI Results (recent):  Results from [place] encounter on 09/01/20   MRI BRAIN WO CONT    Narrative CLINICAL HISTORY: headache and weakness of the R side; evaluate for stroke. INDICATION: headache and weakness of the R side; evaluate for stroke. COMPARISON: CT 8/26/2020    TECHNIQUE: MR examination of the brain includes axial and sagittal T1, coronal  T2, axial T2, axial FLAIR, axial gradient echo, axial DWI. CONTRAST: None    FINDINGS:   There is no intracranial mass, hemorrhage or evidence of acute infarction. Subacute infarction in the left linda is small/moderate in size. Confluent  periventricular scattered foci of increased T2 signal intensity in the corona  radiata and centrum semiovale. Mild sulcal and ventricular prominence. There is  no superimposed hemorrhage, midline shift or mass effect. The brain architecture is normal. There is no evidence of midline shift or  mass-effect. There are no extra-axial fluid collections. There is no Chiari or  syrinx. The pituitary and infundibulum are grossly unremarkable. There is no  skull base mass. Cerebellopontine angles are grossly unremarkable.  The major  intracranial vascular flow-voids are unremarkable. The cavernous sinuses are  symmetric. Optic chiasm and infundibulum grossly unremarkable. Orbits are  grossly symmetric. Dural venous sinuses are grossly patent. The mastoid air cells are well pneumatized and clear. Impression IMPRESSION:  Small/moderate subacute infarction in the left linda. There is no associated  hemorrhage, midline shift or mass effect. Otherwise mild to moderate chronic microvascular ischemic change. There is no intracranial mass, hemorrhage. No additional acute intracranial process is demonstrated. IR Results (recent):  No results found for this or any previous visit. VAS/US Results (recent):  No results found for this or any previous visit. Reviewed records in [facility] tab today    Lab Review     Admission on 08/26/2020, Discharged on 08/26/2020   Component Date Value Ref Range Status    WBC 08/26/2020 5.2  3.6 - 11.0 K/uL Final    RBC 08/26/2020 3.91  3.80 - 5.20 M/uL Final    HGB 08/26/2020 12.0  11.5 - 16.0 g/dL Final    HCT 08/26/2020 33.8* 35.0 - 47.0 % Final    MCV 08/26/2020 86.4  80.0 - 99.0 FL Final    MCH 08/26/2020 30.7  26.0 - 34.0 PG Final    MCHC 08/26/2020 35.5  30.0 - 36.5 g/dL Final    RDW 08/26/2020 11.4* 11.5 - 14.5 % Final    PLATELET 18/09/4456 842  150 - 400 K/uL Final    MPV 08/26/2020 8.6* 8.9 - 12.9 FL Final    NRBC 08/26/2020 0.0  0  WBC Final    ABSOLUTE NRBC 08/26/2020 0.00  0.00 - 0.01 K/uL Final    NEUTROPHILS 08/26/2020 65  32 - 75 % Final    LYMPHOCYTES 08/26/2020 23  12 - 49 % Final    MONOCYTES 08/26/2020 8  5 - 13 % Final    EOSINOPHILS 08/26/2020 3  0 - 7 % Final    BASOPHILS 08/26/2020 1  0 - 1 % Final    IMMATURE GRANULOCYTES 08/26/2020 0  0.0 - 0.5 % Final    ABS. NEUTROPHILS 08/26/2020 3.4  1.8 - 8.0 K/UL Final    ABS. LYMPHOCYTES 08/26/2020 1.2  0.8 - 3.5 K/UL Final    ABS. MONOCYTES 08/26/2020 0.4  0.0 - 1.0 K/UL Final    ABS.  EOSINOPHILS 08/26/2020 0.1 0.0 - 0.4 K/UL Final    ABS. BASOPHILS 08/26/2020 0.0  0.0 - 0.1 K/UL Final    ABS. IMM. GRANS. 08/26/2020 0.0  0.00 - 0.04 K/UL Final    DF 08/26/2020 AUTOMATED    Final    Sodium 08/26/2020 131* 136 - 145 mmol/L Final    Potassium 08/26/2020 4.4  3.5 - 5.1 mmol/L Final    Chloride 08/26/2020 98  97 - 108 mmol/L Final    CO2 08/26/2020 25  21 - 32 mmol/L Final    Anion gap 08/26/2020 8  5 - 15 mmol/L Final    Glucose 08/26/2020 129* 65 - 100 mg/dL Final    BUN 08/26/2020 15  6 - 20 MG/DL Final    Creatinine 08/26/2020 0.58  0.55 - 1.02 MG/DL Final    BUN/Creatinine ratio 08/26/2020 26* 12 - 20   Final    GFR est AA 08/26/2020 >60  >60 ml/min/1.73m2 Final    GFR est non-AA 08/26/2020 >60  >60 ml/min/1.73m2 Final    Estimated GFR is calculated using the IDMS-traceable Modification of Diet in Renal Disease (MDRD) Study equation, reported for both  Americans (GFRAA) and non- Americans (GFRNA), and normalized to 1.73m2 body surface area. The physician must decide which value applies to the patient.  Calcium 08/26/2020 8.7  8.5 - 10.1 MG/DL Final    Bilirubin, total 08/26/2020 0.4  0.2 - 1.0 MG/DL Final    ALT (SGPT) 08/26/2020 13  12 - 78 U/L Final    AST (SGOT) 08/26/2020 14* 15 - 37 U/L Final    Alk. phosphatase 08/26/2020 53  45 - 117 U/L Final    Protein, total 08/26/2020 7.5  6.4 - 8.2 g/dL Final    Albumin 08/26/2020 3.8  3.5 - 5.0 g/dL Final    Globulin 08/26/2020 3.7  2.0 - 4.0 g/dL Final    A-G Ratio 08/26/2020 1.0* 1.1 - 2.2   Final    Magnesium 08/26/2020 2.3  1.6 - 2.4 mg/dL Final    SAMPLES BEING HELD 08/26/2020 1SST,1RED,1BLUE   Final    COMMENT 08/26/2020 Add-on orders for these samples will be processed based on acceptable specimen integrity and analyte stability, which may vary by analyte.     Final   Hospital Outpatient Visit on 07/22/2020   Component Date Value Ref Range Status    Sodium 07/22/2020 132* 136 - 145 mmol/L Final    Potassium 07/22/2020 5.1  3.5 - 5.1 mmol/L Final    Chloride 07/22/2020 98  97 - 108 mmol/L Final    CO2 07/22/2020 28  21 - 32 mmol/L Final    Anion gap 07/22/2020 6  5 - 15 mmol/L Final    Glucose 07/22/2020 102* 65 - 100 mg/dL Final    BUN 07/22/2020 13  6 - 20 MG/DL Final    Creatinine 07/22/2020 0.61  0.55 - 1.02 MG/DL Final    BUN/Creatinine ratio 07/22/2020 21* 12 - 20   Final    GFR est AA 07/22/2020 >60  >60 ml/min/1.73m2 Final    GFR est non-AA 07/22/2020 >60  >60 ml/min/1.73m2 Final    Comment: Estimated GFR is calculated using the IDMS-traceable Modification of Diet in Renal Disease (MDRD) Study equation, reported for both  Americans (GFRAA) and non- Americans (GFRNA), and normalized to 1.73m2 body surface area. The physician must decide which value applies to the patient. The MDRD study equation should only be used in individuals age 25 or older. It has not been validated for the following: pregnant women, patients with serious comorbid conditions, or on certain medications, or persons with extremes of body size, muscle mass, or nutritional status.  Calcium 07/22/2020 9.3  8.5 - 10.1 MG/DL Final    Bilirubin, total 07/22/2020 0.5  0.2 - 1.0 MG/DL Final    ALT (SGPT) 07/22/2020 19  12 - 78 U/L Final    AST (SGOT) 07/22/2020 16  15 - 37 U/L Final    Alk. phosphatase 07/22/2020 67  45 - 117 U/L Final    Protein, total 07/22/2020 7.8  6.4 - 8.2 g/dL Final    Albumin 07/22/2020 4.2  3.5 - 5.0 g/dL Final    Globulin 07/22/2020 3.6  2.0 - 4.0 g/dL Final    A-G Ratio 07/22/2020 1.2  1.1 - 2.2   Final    LIPID PROFILE 07/22/2020        Final    Cholesterol, total 07/22/2020 225* <200 MG/DL Final    Triglyceride 07/22/2020 105  <150 MG/DL Final    Based on NCEP-ATP III:  Triglycerides <150 mg/dL  is considered normal, 150-199 mg/dL  borderline high,  200-499 mg/dL high and  greater than or equal to 500 mg/dL very high.     HDL Cholesterol 07/22/2020 91  MG/DL Final    Based on NCEP ATP III, HDL Cholesterol <40 mg/dL is considered low and >60 mg/dL is elevated.  LDL, calculated 07/22/2020 113* 0 - 100 MG/DL Final    Comment: Based on the NCEP-ATP: LDL-C concentrations are considered  optimal <100 mg/dL,  near optimal/above Normal 100-129 mg/dL  Borderline High: 130-159, High: 160-189 mg/dL  Very High: Greater than or equal to 190 mg/dL      VLDL, calculated 07/22/2020 21  MG/DL Final    CHOL/HDL Ratio 07/22/2020 2.5  0.0 - 5.0   Final    Hemoglobin A1c 07/22/2020 5.6  4.0 - 5.6 % Final    Comment: NEW METHOD  PLEASE NOTE NEW REFERENCE RANGE  (NOTE)  HbA1C Interpretive Ranges  <5.7              Normal  5.7 - 6.4         Consider Prediabetes  >6.5              Consider Diabetes      Est. average glucose 07/22/2020 114  mg/dL Final    SAMPLES BEING HELD 07/22/2020 1 sst   Final    COMMENT 07/22/2020 Add-on orders for these samples will be processed based on acceptable specimen integrity and analyte stability, which may vary by analyte. Final         Objective:     General: alert, cooperative, no distress   Mental  status: mental status: alert, oriented to person, place, and time, normal mood, behavior, speech, dress, motor activity, and thought processes   Resp: resp: normal effort and no respiratory distress   Neuro: neuro: no gross deficits   Skin: skin: no discoloration or lesions of concern on visible areas     Due to this being a TeleHealth evaluation, many elements of the physical examination are unable to be assessed. Assessment:       ICD-10-CM ICD-9-CM    1. Cerebrovascular accident (CVA), unspecified mechanism ([facility] [address]  I63.9 434.91 ECHO ADULT COMPLETE      DUPLEX CAROTID BILATERAL     Small/moderate subacute infarction in the left linda.        Plan:   1. Discussed MRI brain confirmed that she did have a small/moderate subacute infarction in the left linda. 2. Will do echocardiogram  3. Refer for carotid doppler  4. Increase Lipitor 40 mg every day to keep LDL < 70  5.  Continue  every day   6. Optimize medical management of HTN  7. I personally reviewed the MRI brain images with the patient      Follow-up and Dispositions    · Return for review of results. CPT Codes 39022-94381 for Established Patients may apply to this Telehealth Visit      We discussed the expected course, resolution and complications of the diagnosis(es) in detail. Medication risks, benefits, costs, interactions, and alternatives were discussed as indicated. I advised her to contact the office if her condition worsens, changes or fails to improve as anticipated. She expressed understanding with the diagnosis(es) and plan. Pursuant to the emergency declaration under the [address] [place] [address], [facility] waiver authority and the "[employer]," and [employer] Act, this Virtual  Visit was conducted, with patient's consent, to reduce the patient's risk of exposure to COVID-19 and provide continuity of care for an established patient. Services were provided through a video synchronous discussion virtually to substitute for in-person clinic visit.        Aleida Evans MD  Diplomate, American Board of Psychiatry and Neurology  Diplomate, Neuromuscular Medicine  Diplomate, American Board of Electrodiagnostic Medicine

## 2020-09-03 NOTE — LETTER
9/3/2020 2:35 PM 
 
Patient:  Maynor Marks YOB: 1955 Date of Visit: 9/3/2020 Dear Crystal Hernández MD 
[address] [facility] [phone] [id number] VIA In Basket: Thank you for referring Ms. John Carmona to me for evaluation/treatment. Below are the relevant portions of my assessment and plan of care. If you have questions, please do not hesitate to call me. I look forward to following Ms. Diane along with you. Sincerely, Marychuy Traylor MD

## 2020-09-08 ENCOUNTER — HOSPITAL ENCOUNTER (OUTPATIENT)
Dept: NON INVASIVE DIAGNOSTICS | Age: 65
Discharge: HOME | End: 2020-09-08
Attending: PSYCHIATRY & NEUROLOGY
Payer: COMMERCIAL

## 2020-09-08 ENCOUNTER — HOSPITAL ENCOUNTER (OUTPATIENT)
Dept: VASCULAR SURGERY | Age: 65
Discharge: HOME | End: 2020-09-08
Attending: PSYCHIATRY & NEUROLOGY
Payer: COMMERCIAL

## 2020-09-08 VITALS
DIASTOLIC BLOOD PRESSURE: 76 MMHG | BODY MASS INDEX: 20.56 KG/M2 | WEIGHT: 108.91 LBS | HEIGHT: 61 IN | SYSTOLIC BLOOD PRESSURE: 151 MMHG

## 2020-09-08 DIAGNOSIS — I63.9 CEREBROVASCULAR ACCIDENT (CVA), UNSPECIFIED MECHANISM (HCC): ICD-10-CM

## 2020-09-08 LAB
ECHO AO ASC DIAM: 3.14 CM
ECHO AO ROOT DIAM: 2.96 CM
ECHO AR MAX VEL PISA: 465.89 CENTIMETER/SECOND
ECHO AV AREA PEAK VELOCITY: 2.22 CM2
ECHO AV AREA/BSA PEAK VELOCITY: 1.5 CM2/M2
ECHO AV PEAK GRADIENT: 5.72 MMHG
ECHO AV PEAK VELOCITY: 119.58 CM/S
ECHO AV REGURGITANT PHT: 0.74 S
ECHO IVC PROX: 1.48 CM
ECHO LA AREA 4C: 9.36 CM2
ECHO LA MAJOR AXIS: 3.1 CM
ECHO LA MINOR AXIS: 2.12 CM
ECHO LA VOL 2C: 27 ML (ref 22–52)
ECHO LA VOL 4C: 17.87 ML (ref 22–52)
ECHO LA VOL BP: 24.44 ML (ref 22–52)
ECHO LA VOL/BSA BIPLANE: 16.75 ML/M2 (ref 16–28)
ECHO LA VOLUME INDEX A2C: 18.51 ML/M2 (ref 16–28)
ECHO LA VOLUME INDEX A4C: 12.25 ML/M2 (ref 16–28)
ECHO LV E' LATERAL VELOCITY: 8.84 CENTIMETER/SECOND
ECHO LV E' SEPTAL VELOCITY: 7.28 CENTIMETER/SECOND
ECHO LV INTERNAL DIMENSION DIASTOLIC: 3.94 CM (ref 3.9–5.3)
ECHO LV INTERNAL DIMENSION SYSTOLIC: 2.57 CM
ECHO LV IVSD: 0.78 CM (ref 0.6–0.9)
ECHO LV MASS 2D: 93.5 G (ref 67–162)
ECHO LV MASS INDEX 2D: 64.1 G/M2 (ref 43–95)
ECHO LV POSTERIOR WALL DIASTOLIC: 0.85 CM (ref 0.6–0.9)
ECHO LVOT DIAM: 1.78 CM
ECHO LVOT PEAK GRADIENT: 4.83 MMHG
ECHO LVOT PEAK VELOCITY: 106.33 CM/S
ECHO LVOT SV: 64.7 ML
ECHO LVOT VTI: 25.95 CM
ECHO MV A VELOCITY: 88.49 CENTIMETER/SECOND
ECHO MV AREA PHT: 5.2 CM2
ECHO MV E DECELERATION TIME (DT): 0.15 S
ECHO MV E VELOCITY: 75.64 CENTIMETER/SECOND
ECHO MV PRESSURE HALF TIME (PHT): 0.04 S
ECHO PV MAX VELOCITY: 80.36 CM/S
ECHO PV PEAK INSTANTANEOUS GRADIENT SYSTOLIC: 2.58 MMHG
ECHO RV INTERNAL DIMENSION: 3.3 CM
ECHO RV TAPSE: 1.81 CM (ref 1.5–2)
ECHO TV REGURGITANT MAX VELOCITY: 251.25 CM/S
ECHO TV REGURGITANT PEAK GRADIENT: 25.25 MMHG
LA VOL DISK BP: 22.83 ML (ref 22–52)
LVOT MG: 2.4 MMHG

## 2020-09-08 PROCEDURE — 96374 THER/PROPH/DIAG INJ IV PUSH: CPT

## 2020-09-08 PROCEDURE — 93880 EXTRACRANIAL BILAT STUDY: CPT

## 2020-09-09 ENCOUNTER — VIRTUAL VISIT (OUTPATIENT)
Dept: NEUROLOGY | Age: 65
End: 2020-09-09
Payer: COMMERCIAL

## 2020-09-09 DIAGNOSIS — I63.9 CEREBROVASCULAR ACCIDENT (CVA), UNSPECIFIED MECHANISM (HCC): Primary | ICD-10-CM

## 2020-09-09 LAB
LEFT CCA DIST DIAS: 16.1 CM/S
LEFT CCA DIST SYS: 51.2 CM/S
LEFT CCA PROX DIAS: 18.7 CM/S
LEFT CCA PROX SYS: 93 CM/S
LEFT ECA DIAS: 6.16 CM/S
LEFT ECA SYS: 47.9 CM/S
LEFT ICA DIST DIAS: 26.8 CM/S
LEFT ICA DIST SYS: 81.2 CM/S
LEFT ICA MID DIAS: 38.1 CM/S
LEFT ICA MID SYS: 97.9 CM/S
LEFT ICA PROX DIAS: 17.2 CM/S
LEFT ICA PROX SYS: 46.8 CM/S
LEFT ICA/CCA SYS: 1.91
LEFT SUBCLAVIAN DIAS: 0 CM/S
LEFT SUBCLAVIAN SYS: 131.5 CM/S
LEFT VERTEBRAL DIAS: 10.12 CM/S
LEFT VERTEBRAL SYS: 42.4 CM/S
RIGHT CCA DIST DIAS: 14.2 CM/S
RIGHT CCA DIST SYS: 61.4 CM/S
RIGHT CCA PROX DIAS: 11.5 CM/S
RIGHT CCA PROX SYS: 69.8 CM/S
RIGHT ECA DIAS: 7.58 CM/S
RIGHT ECA SYS: 52.6 CM/S
RIGHT ICA DIST DIAS: 30.8 CM/S
RIGHT ICA DIST SYS: 95.7 CM/S
RIGHT ICA MID DIAS: 24.3 CM/S
RIGHT ICA MID SYS: 55.2 CM/S
RIGHT ICA PROX DIAS: 14.9 CM/S
RIGHT ICA PROX SYS: 43.3 CM/S
RIGHT ICA/CCA SYS: 1.6
RIGHT SUBCLAVIAN DIAS: 0 CM/S
RIGHT SUBCLAVIAN SYS: 96.4 CM/S
RIGHT VERTEBRAL DIAS: 13.85 CM/S
RIGHT VERTEBRAL SYS: 50.1 CM/S

## 2020-09-09 PROCEDURE — 99215 OFFICE O/P EST HI 40 MIN: CPT | Performed by: PSYCHIATRY & NEUROLOGY

## 2020-09-09 RX ORDER — ASPIRIN 325 MG
325 TABLET ORAL DAILY
Qty: 30 TAB | Refills: 0
Start: 2020-09-09

## 2020-09-09 NOTE — PROGRESS NOTES
Hettie Cabot, MD Hoggood, April M, LPN     Pls schedule patient (can do virtual) to discuss results  9/9/20 Results of doppler and echo were discussed today at virtual visit. Sanjuanita Bell LPN.

## 2020-09-09 NOTE — PROGRESS NOTES
Senait Scott is a 63 y.o. female who was seen by synchronous (real-time) audio-video technology on 9/9/2020. Subjective:   Jim Soulier a 63 y.o. [place] female who  has a past medical history of Essential hypertension, benign (6/17/2015), Other and unspecified hyperlipidemia (11/10/2009), and Other and unspecified hyperlipidemia (11/10/2009).  who on 8/26/2020, patient went to [facility] ER for an event where she had generalized headache 4/10, on and off for 3-4 days then had diaphoresis and dizzy. Since then, patient noted unsteadiness described as R sided weakness for 5 days. Patient was offered admission, but patient declined. Takes ASA 81 every day. She increased it to 325 on her own accord. Considerations include stroke (family history of stroke) and complex migraine (stress with selling house and trying to go back to the [place] associated with initial headache).     Patient has no new event on . Also now on Lipitor 40 mg every day.     Recent tests done:  Carotid doppler: (-) stenosis  Echo; EF 60-65%; (-) PFO       ROS:  Per HPI-  Otherwise 12 point ROS was negative    Social Hx:  Social History     Socioeconomic History    Marital status:      Spouse name: Not on file    Number of children: Not on file    Years of education: Not on file    Highest education level: Not on file   Tobacco Use    Smoking status: Never Smoker    Smokeless tobacco: Never Used   Substance and Sexual Activity    Alcohol use: No    Drug use: No       Meds:  Current Outpatient Medications on File Prior to Visit   Medication Sig Dispense Refill    atorvastatin (LIPITOR) 40 mg tablet Take 1 Tab by mouth daily. 30 Tab 5    multivitamin (ONE A DAY) tablet Take 1 Tab by mouth daily.  ascorbic acid, vitamin C, (Vitamin C) 250 mg tablet Take  by mouth.       olmesartan (BENICAR) 40 mg tablet TAKE 1 TABLET BY MOUTH EVERY DAY 90 Tab 1     No current facility-administered medications on file prior to visit. Imaging:    CT Results (recent):  Results from Hospital Encounter encounter on 08/26/20   CT HEAD WO CONT    Narrative INDICATION: dizziness, headache, right side feels \"unsteady and weak\" x 5 days  normal exam     Exam: Noncontrast CT of the brain is performed with 5 mm collimation. CT dose reduction was achieved with the use of the standardized protocol  tailored for this examination and automatic exposure control for dose  modulation. There is no prior study for direct comparison. FINDINGS: There is no acute intracranial hemorrhage, mass, mass effect or  herniation. Ventricular system is normal. The gray-white matter differentiation  is well-preserved. The mastoid air cells are well pneumatized. The visualized  paranasal sinuses are normal.      Impression IMPRESSION: No acute intracranial hemorrhage, mass or infarct. MRI Results (recent):  Results from [place] encounter on 09/01/20   MRI BRAIN WO CONT    Narrative CLINICAL HISTORY: headache and weakness of the R side; evaluate for stroke. INDICATION: headache and weakness of the R side; evaluate for stroke. COMPARISON: CT 8/26/2020    TECHNIQUE: MR examination of the brain includes axial and sagittal T1, coronal  T2, axial T2, axial FLAIR, axial gradient echo, axial DWI. CONTRAST: None    FINDINGS:   There is no intracranial mass, hemorrhage or evidence of acute infarction. Subacute infarction in the left linda is small/moderate in size. Confluent  periventricular scattered foci of increased T2 signal intensity in the corona  radiata and centrum semiovale. Mild sulcal and ventricular prominence. There is  no superimposed hemorrhage, midline shift or mass effect. The brain architecture is normal. There is no evidence of midline shift or  mass-effect. There are no extra-axial fluid collections. There is no Chiari or  syrinx. The pituitary and infundibulum are grossly unremarkable.  There is no  skull base mass. Cerebellopontine angles are grossly unremarkable. The major  intracranial vascular flow-voids are unremarkable. The cavernous sinuses are  symmetric. Optic chiasm and infundibulum grossly unremarkable. Orbits are  grossly symmetric. Dural venous sinuses are grossly patent. The mastoid air cells are well pneumatized and clear. Impression IMPRESSION:  Small/moderate subacute infarction in the left linda. There is no associated  hemorrhage, midline shift or mass effect. Otherwise mild to moderate chronic microvascular ischemic change. There is no intracranial mass, hemorrhage. No additional acute intracranial process is demonstrated. IR Results (recent):  No results found for this or any previous visit. VAS/US Results (recent):  No results found for this or any previous visit.     Reviewed records in [employer] tab today    Lab Review     Hospital Outpatient Visit on 09/08/2020   Component Date Value Ref Range Status    IVSd 09/08/2020 0.78  0.6 - 0.9 cm Final    LVIDd 09/08/2020 3.94  3.9 - 5.3 cm Final    LVIDs 09/08/2020 2.57  cm Final    LVOT d 09/08/2020 1.78  cm Final    LVPWd 09/08/2020 0.85  0.6 - 0.9 cm Final    LVOT Peak Gradient 09/08/2020 4.83  mmHg Final    Left Ventricular Outflow Tract Kayleigh* 09/08/2020 2.40  mmHg Final    LVOT SV 09/08/2020 64.7  mL Final    LVOT Peak Velocity 09/08/2020 106.33  cm/s Final    LVOT VTI 09/08/2020 25.95  cm Final    RVIDd 09/08/2020 3.30  cm Final    Left Atrium Major Axis 09/08/2020 3.10  cm Final    LA Volume 09/08/2020 24.44  22 - 52 mL Final    LA Area 4C 09/08/2020 9.36  cm2 Final    LA Vol 2C 09/08/2020 27.00  22 - 52 mL Final    LA Vol 4C 09/08/2020 17.87* 22 - 52 mL Final    LA Volume DISK BP 09/08/2020 22.83  22 - 52 mL Final    Aortic Valve Area by Continuity of* 09/08/2020 2.22  cm2 Final    Aortic Regurgitant Pressure Half-t* 09/08/2020 0.74  s Final    AR Max Jesus 09/08/2020 465.89  centimeter/second Final    AoV PG 09/08/2020 5.72  mmHg Final    Aortic Valve Systolic Peak Velocity 01/62/2662 119.58  cm/s Final    MV A Jesus 09/08/2020 88.49  centimeter/second Final    Mitral Valve E Wave Deceleration T* 09/08/2020 0.15  s Final    MV E Jesus 09/08/2020 75.64  centimeter/second Final    LV E' Lateral Velocity 09/08/2020 8.84  centimeter/second Final    LV E' Septal Velocity 09/08/2020 7.28  centimeter/second Final    Mitral Valve Pressure Half-time 09/08/2020 0.04  s Final    MVA (PHT) 09/08/2020 5.20  cm2 Final    Pulmonic Valve Systolic Peak Insta* 16/14/6490 2.58  mmHg Final    Pulmonic Valve Max Velocity 09/08/2020 80.36  cm/s Final    Tapse 09/08/2020 1.81  1.5 - 2.0 cm Final    Triscuspid Valve Regurgitation Pea* 09/08/2020 25.25  mmHg Final    TR Max Velocity 09/08/2020 251.25  cm/s Final    AO ASC D 09/08/2020 3.14  cm Final    Ao Root D 09/08/2020 2.96  cm Final    IVC proximal 09/08/2020 1.48  cm Final    LV Mass AL 09/08/2020 93.5  67 - 162 g Final    LV Mass AL Index 09/08/2020 64.1  43 - 95 g/m2 Final    Left Atrium Minor Axis 09/08/2020 2.12  cm Final    LA Vol Index 09/08/2020 16.75  16 - 28 ml/m2 Final    LA Vol Index 09/08/2020 18.51  16 - 28 ml/m2 Final    LA Vol Index 09/08/2020 12.25  16 - 28 ml/m2 Final    TIM/BSA Pk Jesus 09/08/2020 1.5  cm2/m2 Final   Hospital Outpatient Visit on 09/08/2020   Component Date Value Ref Range Status    Right subclavian sys 09/08/2020 96.4  cm/s Final    RIGHT SUBCLAVIAN ARTERY D 09/08/2020 0.00  cm/s Final    Right cca dist sys 09/08/2020 61.4  cm/s Final    Right CCA dist goode 09/08/2020 14.2  cm/s Final    Right CCA prox sys 09/08/2020 69.8  cm/s Final    Right CCA prox goode 09/08/2020 11.5  cm/s Final    Right eca sys 09/08/2020 52.6  cm/s Final    RIGHT EXTERNAL CAROTID ARTERY D 09/08/2020 7.58  cm/s Final    Right ICA dist sys 09/08/2020 95.7  cm/s Final    Right ICA dist goode 09/08/2020 30.8  cm/s Final    Right ICA mid sys 09/08/2020 55.2  cm/s Final    Right ICA mid goode 09/08/2020 24.3  cm/s Final    Right ICA prox sys 09/08/2020 43.3  cm/s Final    Right ICA prox goode 09/08/2020 14.9  cm/s Final    Right vertebral sys 09/08/2020 50.1  cm/s Final    RIGHT VERTEBRAL ARTERY D 09/08/2020 13.85  cm/s Final    Right ICA/CCA sys 09/08/2020 1.6   Final    Left subclavian sys 09/08/2020 131.5  cm/s Final    LEFT SUBCLAVIAN ARTERY D 09/08/2020 0.00  cm/s Final    Left CCA dist sys 09/08/2020 51.2  cm/s Final    Left CCA dist goode 09/08/2020 16.1  cm/s Final    Left CCA prox sys 09/08/2020 93.0  cm/s Final    Left CCA prox goode 09/08/2020 18.7  cm/s Final    Left ECA sys 09/08/2020 47.9  cm/s Final    LEFT EXTERNAL CAROTID ARTERY D 09/08/2020 6. 16  cm/s Final    Left ICA dist sys 09/08/2020 81.2  cm/s Final    Left ICA dist goode 09/08/2020 26.8  cm/s Final    Left ICA mid sys 09/08/2020 97.9  cm/s Final    Left ICA mid goode 09/08/2020 38.1  cm/s Final    Left ICA prox sys 09/08/2020 46.8  cm/s Final    Left ICA prox goode 09/08/2020 17.2  cm/s Final    Left vertebral sys 09/08/2020 42.4  cm/s Final    LEFT VERTEBRAL ARTERY D 09/08/2020 10.12  cm/s Final    Left ICA/CCA sys 09/08/2020 1.91   Final   Admission on 08/26/2020, Discharged on 08/26/2020   Component Date Value Ref Range Status    WBC 08/26/2020 5.2  3.6 - 11.0 K/uL Final    RBC 08/26/2020 3.91  3.80 - 5.20 M/uL Final    HGB 08/26/2020 12.0  11.5 - 16.0 g/dL Final    HCT 08/26/2020 33.8* 35.0 - 47.0 % Final    MCV 08/26/2020 86.4  80.0 - 99.0 FL Final    MCH 08/26/2020 30.7  26.0 - 34.0 PG Final    MCHC 08/26/2020 35.5  30.0 - 36.5 g/dL Final    RDW 08/26/2020 11.4* 11.5 - 14.5 % Final    PLATELET 74/34/5751 855  150 - 400 K/uL Final    MPV 08/26/2020 8.6* 8.9 - 12.9 FL Final    NRBC 08/26/2020 0.0  0  WBC Final    ABSOLUTE NRBC 08/26/2020 0.00  0.00 - 0.01 K/uL Final    NEUTROPHILS 08/26/2020 65  32 - 75 % Final    LYMPHOCYTES 08/26/2020 23 12 - 49 % Final    MONOCYTES 08/26/2020 8  5 - 13 % Final    EOSINOPHILS 08/26/2020 3  0 - 7 % Final    BASOPHILS 08/26/2020 1  0 - 1 % Final    IMMATURE GRANULOCYTES 08/26/2020 0  0.0 - 0.5 % Final    ABS. NEUTROPHILS 08/26/2020 3.4  1.8 - 8.0 K/UL Final    ABS. LYMPHOCYTES 08/26/2020 1.2  0.8 - 3.5 K/UL Final    ABS. MONOCYTES 08/26/2020 0.4  0.0 - 1.0 K/UL Final    ABS. EOSINOPHILS 08/26/2020 0.1  0.0 - 0.4 K/UL Final    ABS. BASOPHILS 08/26/2020 0.0  0.0 - 0.1 K/UL Final    ABS. IMM. GRANS. 08/26/2020 0.0  0.00 - 0.04 K/UL Final    DF 08/26/2020 AUTOMATED    Final    Sodium 08/26/2020 131* 136 - 145 mmol/L Final    Potassium 08/26/2020 4.4  3.5 - 5.1 mmol/L Final    Chloride 08/26/2020 98  97 - 108 mmol/L Final    CO2 08/26/2020 25  21 - 32 mmol/L Final    Anion gap 08/26/2020 8  5 - 15 mmol/L Final    Glucose 08/26/2020 129* 65 - 100 mg/dL Final    BUN 08/26/2020 15  6 - 20 MG/DL Final    Creatinine 08/26/2020 0.58  0.55 - 1.02 MG/DL Final    BUN/Creatinine ratio 08/26/2020 26* 12 - 20   Final    GFR est AA 08/26/2020 >60  >60 ml/min/1.73m2 Final    GFR est non-AA 08/26/2020 >60  >60 ml/min/1.73m2 Final    Estimated GFR is calculated using the IDMS-traceable Modification of Diet in Renal Disease (MDRD) Study equation, reported for both  Americans (GFRAA) and non- Americans (GFRNA), and normalized to 1.73m2 body surface area. The physician must decide which value applies to the patient.  Calcium 08/26/2020 8.7  8.5 - 10.1 MG/DL Final    Bilirubin, total 08/26/2020 0.4  0.2 - 1.0 MG/DL Final    ALT (SGPT) 08/26/2020 13  12 - 78 U/L Final    AST (SGOT) 08/26/2020 14* 15 - 37 U/L Final    Alk.  phosphatase 08/26/2020 53  45 - 117 U/L Final    Protein, total 08/26/2020 7.5  6.4 - 8.2 g/dL Final    Albumin 08/26/2020 3.8  3.5 - 5.0 g/dL Final    Globulin 08/26/2020 3.7  2.0 - 4.0 g/dL Final    A-G Ratio 08/26/2020 1.0* 1.1 - 2.2   Final    Magnesium 08/26/2020 2.3  1.6 - 2.4 mg/dL Final    SAMPLES BEING HELD 08/26/2020 1SST,1RED,1BLUE   Final    COMMENT 08/26/2020 Add-on orders for these samples will be processed based on acceptable specimen integrity and analyte stability, which may vary by analyte. Final   Hospital Outpatient Visit on 07/22/2020   Component Date Value Ref Range Status    Sodium 07/22/2020 132* 136 - 145 mmol/L Final    Potassium 07/22/2020 5.1  3.5 - 5.1 mmol/L Final    Chloride 07/22/2020 98  97 - 108 mmol/L Final    CO2 07/22/2020 28  21 - 32 mmol/L Final    Anion gap 07/22/2020 6  5 - 15 mmol/L Final    Glucose 07/22/2020 102* 65 - 100 mg/dL Final    BUN 07/22/2020 13  6 - 20 MG/DL Final    Creatinine 07/22/2020 0.61  0.55 - 1.02 MG/DL Final    BUN/Creatinine ratio 07/22/2020 21* 12 - 20   Final    GFR est AA 07/22/2020 >60  >60 ml/min/1.73m2 Final    GFR est non-AA 07/22/2020 >60  >60 ml/min/1.73m2 Final    Comment: Estimated GFR is calculated using the IDMS-traceable Modification of Diet in Renal Disease (MDRD) Study equation, reported for both  Americans (GFRAA) and non- Americans (GFRNA), and normalized to 1.73m2 body surface area. The physician must decide which value applies to the patient. The MDRD study equation should only be used in individuals age 25 or older. It has not been validated for the following: pregnant women, patients with serious comorbid conditions, or on certain medications, or persons with extremes of body size, muscle mass, or nutritional status.  Calcium 07/22/2020 9.3  8.5 - 10.1 MG/DL Final    Bilirubin, total 07/22/2020 0.5  0.2 - 1.0 MG/DL Final    ALT (SGPT) 07/22/2020 19  12 - 78 U/L Final    AST (SGOT) 07/22/2020 16  15 - 37 U/L Final    Alk.  phosphatase 07/22/2020 67  45 - 117 U/L Final    Protein, total 07/22/2020 7.8  6.4 - 8.2 g/dL Final    Albumin 07/22/2020 4.2  3.5 - 5.0 g/dL Final    Globulin 07/22/2020 3.6  2.0 - 4.0 g/dL Final    A-G Ratio 07/22/2020 1.2  1.1 - 2.2 Final    LIPID PROFILE 07/22/2020        Final    Cholesterol, total 07/22/2020 225* <200 MG/DL Final    Triglyceride 07/22/2020 105  <150 MG/DL Final    Based on NCEP-ATP III:  Triglycerides <150 mg/dL  is considered normal, 150-199 mg/dL  borderline high,  200-499 mg/dL high and  greater than or equal to 500 mg/dL very high.  HDL Cholesterol 07/22/2020 91  MG/DL Final    Based on NCEP ATP III, HDL Cholesterol <40 mg/dL is considered low and >60 mg/dL is elevated.  LDL, calculated 07/22/2020 113* 0 - 100 MG/DL Final    Comment: Based on the NCEP-ATP: LDL-C concentrations are considered  optimal <100 mg/dL,  near optimal/above Normal 100-129 mg/dL  Borderline High: 130-159, High: 160-189 mg/dL  Very High: Greater than or equal to 190 mg/dL      VLDL, calculated 07/22/2020 21  MG/DL Final    CHOL/HDL Ratio 07/22/2020 2.5  0.0 - 5.0   Final    Hemoglobin A1c 07/22/2020 5.6  4.0 - 5.6 % Final    Comment: NEW METHOD  PLEASE NOTE NEW REFERENCE RANGE  (NOTE)  HbA1C Interpretive Ranges  <5.7              Normal  5.7 - 6.4         Consider Prediabetes  >6.5              Consider Diabetes      Est. average glucose 07/22/2020 114  mg/dL Final    SAMPLES BEING HELD 07/22/2020 1 sst   Final    COMMENT 07/22/2020 Add-on orders for these samples will be processed based on acceptable specimen integrity and analyte stability, which may vary by analyte. Final         Objective:     General: alert, cooperative, no distress   Mental  status: mental status: alert, oriented to person, place, and time, normal mood, behavior, speech, dress, motor activity, and thought processes   Resp: resp: normal effort and no respiratory distress   Neuro: neuro: no gross deficits   Skin: skin: no discoloration or lesions of concern on visible areas     Due to this being a TeleHealth evaluation, many elements of the physical examination are unable to be assessed. Assessment:       ICD-10-CM ICD-9-CM    1.  Cerebrovascular accident (CVA), unspecified mechanism ([facility] [address]  I63.9 434.91        Small/moderate subacute infarction in the left linda.        Plan:   1. Discussed Echo and Carotid doppler are both unremarkable  2. Continue Lipitor 40 mg every day to keep LDL < 70  3. Continue  every day   4. Optimize medical management of HTN        Follow-up and Dispositions    · Return if symptoms worsen or fail to improve. CPT Codes 89753-37711 for Established Patients may apply to this Telehealth Visit      We discussed the expected course, resolution and complications of the diagnosis(es) in detail. Medication risks, benefits, costs, interactions, and alternatives were discussed as indicated. I advised her to contact the office if her condition worsens, changes or fails to improve as anticipated. She expressed understanding with the diagnosis(es) and plan. Pursuant to the emergency declaration under the [facility], [facility] waiver authority and the [employer] and [employer] Act, this Virtual  Visit was conducted, with patient's consent, to reduce the patient's risk of exposure to COVID-19 and provide continuity of care for an established patient. Services were provided through a video synchronous discussion virtually to substitute for in-person clinic visit.        Kory Barahona MD  Diplomate, American Board of Psychiatry and Neurology  Diplomate, Neuromuscular Medicine  Diplomate, American Board of Electrodiagnostic Medicine

## 2020-09-09 NOTE — PROGRESS NOTES
Chief Complaint   Patient presents with    Results     virtual visit to discuss doppler and echo results.

## 2020-09-15 RX ORDER — OLMESARTAN MEDOXOMIL 40 MG/1
TABLET ORAL
Qty: 90 TAB | Refills: 1 | Status: SHIPPED | OUTPATIENT
Start: 2020-09-15 | End: 2021-03-13

## 2021-01-25 ENCOUNTER — PATIENT MESSAGE (OUTPATIENT)
Dept: INTERNAL MEDICINE CLINIC | Age: 66
End: 2021-01-25

## 2021-02-01 RX ORDER — ATORVASTATIN CALCIUM 40 MG/1
TABLET, FILM COATED ORAL
Qty: 90 TAB | Refills: 1 | Status: SHIPPED | OUTPATIENT
Start: 2021-02-01 | End: 2021-04-06 | Stop reason: SDUPTHER

## 2021-04-06 ENCOUNTER — OFFICE VISIT (OUTPATIENT)
Dept: INTERNAL MEDICINE CLINIC | Age: 66
End: 2021-04-06
Payer: COMMERCIAL

## 2021-04-06 VITALS
HEIGHT: 61 IN | OXYGEN SATURATION: 100 % | RESPIRATION RATE: 18 BRPM | HEART RATE: 77 BPM | BODY MASS INDEX: 21.22 KG/M2 | SYSTOLIC BLOOD PRESSURE: 130 MMHG | TEMPERATURE: 98.3 F | DIASTOLIC BLOOD PRESSURE: 79 MMHG | WEIGHT: 112.4 LBS

## 2021-04-06 DIAGNOSIS — Z00.00 ROUTINE GENERAL MEDICAL EXAMINATION AT A HEALTH CARE FACILITY: Primary | ICD-10-CM

## 2021-04-06 DIAGNOSIS — E78.5 HYPERLIPIDEMIA, UNSPECIFIED HYPERLIPIDEMIA TYPE: ICD-10-CM

## 2021-04-06 DIAGNOSIS — Z86.73 HISTORY OF CVA (CEREBROVASCULAR ACCIDENT): ICD-10-CM

## 2021-04-06 DIAGNOSIS — I10 ESSENTIAL HYPERTENSION, BENIGN: ICD-10-CM

## 2021-04-06 PROCEDURE — 99396 PREV VISIT EST AGE 40-64: CPT | Performed by: INTERNAL MEDICINE

## 2021-04-06 PROCEDURE — 99214 OFFICE O/P EST MOD 30 MIN: CPT | Performed by: INTERNAL MEDICINE

## 2021-04-06 RX ORDER — OLMESARTAN MEDOXOMIL 40 MG/1
40 TABLET ORAL DAILY
Qty: 90 TAB | Refills: 1 | Status: SHIPPED | OUTPATIENT
Start: 2021-04-06 | End: 2021-12-05

## 2021-04-06 RX ORDER — ATORVASTATIN CALCIUM 40 MG/1
40 TABLET, FILM COATED ORAL DAILY
Qty: 90 TAB | Refills: 1 | Status: SHIPPED | OUTPATIENT
Start: 2021-04-06 | End: 2022-01-10

## 2021-04-06 NOTE — PROGRESS NOTES
Rafael Sesay (: 1955) is a 61 y.o. female, established patient, here for evaluation of the following chief complaint(s): Annual Wellness Visit       ASSESSMENT/PLAN:  1. Routine general medical examination at a health care facility- she is up to date with ; she exercises, colon and mammo UTD, covid vaccine UTD  2. Hyperlipidemia, unspecified hyperlipidemia type-cont with current dose of lipitor 40 mg  3. Essential hypertension, benign- at goal stable on benicar  4. History of CVA (cerebrovascular accident)-cont with  mg ; no residual effects from the stroke      No follow-ups on file. SUBJECTIVE/OBJECTIVE:  HPI   They are going to move to [place] and they are looking for a place   Subjective:   Rafael Sesay is a 61 y.o. female with hypertension. Hypertension ROS: taking medications as instructed, no medication side effects noted, no TIA's, no chest pain on exertion, no dyspnea on exertion, no swelling of ankles. New concerns: stable on current dose of medicine . Subjective:   Rafael Sesay is a 61 y.o. female with hyperlipidemia. Cardiovascular risk analysis - 61 y.o. female LDL goal is under 100. ROS: taking medications as instructed, no medication side effects noted, no TIA's, no chest pain on exertion, no dyspnea on exertion, no swelling of ankles. Tolerating meds, no myalgias or other side effects noted  New concerns: stable on medicine no muscle pain    CVA- taking ASA once a day and tolerating medicine . Review of Systems   All other systems reviewed and are negative. Physical Exam  Vitals signs and nursing note reviewed. Constitutional:       Appearance: Normal appearance. She is well-developed. HENT:      Head: Normocephalic and atraumatic.       Right Ear: External ear normal.      Left Ear: External ear normal.      Nose: Nose normal.   Eyes:      Conjunctiva/sclera: Conjunctivae normal.      Pupils: Pupils are equal, round, and reactive to light.   Neck:      Musculoskeletal: Normal range of motion and neck supple. Thyroid: No thyromegaly. Vascular: No carotid bruit. Cardiovascular:      Rate and Rhythm: Normal rate and regular rhythm. Heart sounds: Normal heart sounds, S1 normal and S2 normal.   Pulmonary:      Effort: Pulmonary effort is normal.      Breath sounds: Normal breath sounds. Abdominal:      General: Bowel sounds are normal.      Palpations: Abdomen is soft. Tenderness: There is no abdominal tenderness. Musculoskeletal: Normal range of motion. Skin:     General: Skin is warm and dry. Neurological:      Mental Status: She is alert and oriented to person, place, and time. Psychiatric:         Behavior: Behavior normal.         Thought Content: Thought content normal.         Judgment: Judgment normal.           On this date 04/06/2021 I have spent 30 minutes reviewing previous notes, test results and face to face with the patient discussing the diagnosis and importance of compliance with the treatment plan as well as documenting on the day of the visit. An electronic signature was used to authenticate this note.   -- Chris Diaz MD

## 2021-04-13 LAB
ALBUMIN SERPL-MCNC: 4.4 G/DL (ref 3.5–5)
ALBUMIN/GLOB SERPL: 1.3 {RATIO} (ref 1.1–2.2)
ALP SERPL-CCNC: 77 U/L (ref 45–117)
ALT SERPL-CCNC: 18 U/L (ref 12–78)
ANION GAP SERPL CALC-SCNC: 5 MMOL/L (ref 5–15)
AST SERPL-CCNC: 20 U/L (ref 15–37)
BILIRUB SERPL-MCNC: 0.4 MG/DL (ref 0.2–1)
BUN SERPL-MCNC: 10 MG/DL (ref 6–20)
BUN/CREAT SERPL: 19 (ref 12–20)
CALCIUM SERPL-MCNC: 8.9 MG/DL (ref 8.5–10.1)
CHLORIDE SERPL-SCNC: 98 MMOL/L (ref 97–108)
CHOLEST SERPL-MCNC: 171 MG/DL
CO2 SERPL-SCNC: 29 MMOL/L (ref 21–32)
CREAT SERPL-MCNC: 0.52 MG/DL (ref 0.55–1.02)
ERYTHROCYTE [DISTWIDTH] IN BLOOD BY AUTOMATED COUNT: 11.5 % (ref 11.5–14.5)
GLOBULIN SER CALC-MCNC: 3.3 G/DL (ref 2–4)
GLUCOSE SERPL-MCNC: 102 MG/DL (ref 65–100)
HCT VFR BLD AUTO: 37.3 % (ref 35–47)
HDLC SERPL-MCNC: 79 MG/DL
HDLC SERPL: 2.2 {RATIO} (ref 0–5)
HGB BLD-MCNC: 12.8 G/DL (ref 11.5–16)
LDLC SERPL CALC-MCNC: 72.2 MG/DL (ref 0–100)
LIPID PROFILE,FLP: NORMAL
MCH RBC QN AUTO: 30.4 PG (ref 26–34)
MCHC RBC AUTO-ENTMCNC: 34.3 G/DL (ref 30–36.5)
MCV RBC AUTO: 88.6 FL (ref 80–99)
NRBC # BLD: 0 K/UL (ref 0–0.01)
NRBC BLD-RTO: 0 PER 100 WBC
PLATELET # BLD AUTO: 274 K/UL (ref 150–400)
PMV BLD AUTO: 9.9 FL (ref 8.9–12.9)
POTASSIUM SERPL-SCNC: 4.8 MMOL/L (ref 3.5–5.1)
PROT SERPL-MCNC: 7.7 G/DL (ref 6.4–8.2)
RBC # BLD AUTO: 4.21 M/UL (ref 3.8–5.2)
SODIUM SERPL-SCNC: 132 MMOL/L (ref 136–145)
TRIGL SERPL-MCNC: 99 MG/DL (ref ?–150)
VLDLC SERPL CALC-MCNC: 19.8 MG/DL
WBC # BLD AUTO: 4.6 K/UL (ref 3.6–11)

## 2022-02-22 DIAGNOSIS — I10 ESSENTIAL HYPERTENSION, BENIGN: ICD-10-CM

## 2022-02-22 RX ORDER — OLMESARTAN MEDOXOMIL 40 MG/1
TABLET ORAL
Qty: 90 TABLET | Refills: 0 | Status: SHIPPED | OUTPATIENT
Start: 2022-02-22

## 2022-03-18 PROBLEM — I10 HYPERTENSION: Status: ACTIVE | Noted: 2020-01-03

## 2022-04-06 DIAGNOSIS — E78.5 HYPERLIPIDEMIA, UNSPECIFIED HYPERLIPIDEMIA TYPE: ICD-10-CM

## 2022-04-06 RX ORDER — ATORVASTATIN CALCIUM 40 MG/1
TABLET, FILM COATED ORAL
Qty: 90 TABLET | Refills: 0 | Status: SHIPPED | OUTPATIENT
Start: 2022-04-06